# Patient Record
Sex: MALE | Race: WHITE | NOT HISPANIC OR LATINO | URBAN - METROPOLITAN AREA
[De-identification: names, ages, dates, MRNs, and addresses within clinical notes are randomized per-mention and may not be internally consistent; named-entity substitution may affect disease eponyms.]

---

## 2017-01-09 ENCOUNTER — OUTPATIENT (OUTPATIENT)
Dept: OUTPATIENT SERVICES | Facility: HOSPITAL | Age: 72
LOS: 1 days | End: 2017-01-09

## 2017-01-09 ENCOUNTER — APPOINTMENT (OUTPATIENT)
Dept: SPEECH THERAPY | Facility: HOSPITAL | Age: 72
End: 2017-01-09

## 2017-01-09 ENCOUNTER — APPOINTMENT (OUTPATIENT)
Dept: RADIOLOGY | Facility: HOSPITAL | Age: 72
End: 2017-01-09

## 2017-01-09 DIAGNOSIS — Z87.19 PERSONAL HISTORY OF OTHER DISEASES OF THE DIGESTIVE SYSTEM: Chronic | ICD-10-CM

## 2017-01-09 DIAGNOSIS — K44.9 DIAPHRAGMATIC HERNIA WITHOUT OBSTRUCTION OR GANGRENE: ICD-10-CM

## 2017-01-10 ENCOUNTER — APPOINTMENT (OUTPATIENT)
Dept: THORACIC SURGERY | Facility: CLINIC | Age: 72
End: 2017-01-10

## 2017-01-10 VITALS — HEIGHT: 71 IN | BODY MASS INDEX: 26.32 KG/M2 | WEIGHT: 188 LBS

## 2017-01-10 VITALS — OXYGEN SATURATION: 93 % | RESPIRATION RATE: 18 BRPM | HEART RATE: 60 BPM

## 2017-03-27 ENCOUNTER — OUTPATIENT (OUTPATIENT)
Dept: OUTPATIENT SERVICES | Facility: HOSPITAL | Age: 72
LOS: 1 days | End: 2017-03-27
Payer: COMMERCIAL

## 2017-03-27 ENCOUNTER — APPOINTMENT (OUTPATIENT)
Dept: CT IMAGING | Facility: CLINIC | Age: 72
End: 2017-03-27

## 2017-03-27 DIAGNOSIS — Z87.19 PERSONAL HISTORY OF OTHER DISEASES OF THE DIGESTIVE SYSTEM: Chronic | ICD-10-CM

## 2017-03-27 DIAGNOSIS — Z00.8 ENCOUNTER FOR OTHER GENERAL EXAMINATION: ICD-10-CM

## 2017-03-27 PROCEDURE — 71250 CT THORAX DX C-: CPT | Mod: 26

## 2017-03-27 PROCEDURE — 71250 CT THORAX DX C-: CPT

## 2017-04-04 ENCOUNTER — RESULT REVIEW (OUTPATIENT)
Age: 72
End: 2017-04-04

## 2017-04-17 ENCOUNTER — EMERGENCY (EMERGENCY)
Facility: HOSPITAL | Age: 72
LOS: 1 days | Discharge: HOME | End: 2017-04-17
Attending: EMERGENCY MEDICINE | Admitting: EMERGENCY MEDICINE
Payer: COMMERCIAL

## 2017-04-17 VITALS
OXYGEN SATURATION: 99 % | RESPIRATION RATE: 16 BRPM | DIASTOLIC BLOOD PRESSURE: 91 MMHG | SYSTOLIC BLOOD PRESSURE: 143 MMHG | TEMPERATURE: 98 F | HEART RATE: 77 BPM

## 2017-04-17 VITALS
RESPIRATION RATE: 15 BRPM | DIASTOLIC BLOOD PRESSURE: 89 MMHG | HEART RATE: 81 BPM | OXYGEN SATURATION: 96 % | WEIGHT: 182.98 LBS | SYSTOLIC BLOOD PRESSURE: 130 MMHG | TEMPERATURE: 99 F | HEIGHT: 70 IN

## 2017-04-17 DIAGNOSIS — Z87.19 PERSONAL HISTORY OF OTHER DISEASES OF THE DIGESTIVE SYSTEM: Chronic | ICD-10-CM

## 2017-04-17 DIAGNOSIS — E78.5 HYPERLIPIDEMIA, UNSPECIFIED: ICD-10-CM

## 2017-04-17 DIAGNOSIS — I82.402 ACUTE EMBOLISM AND THROMBOSIS OF UNSPECIFIED DEEP VEINS OF LEFT LOWER EXTREMITY: ICD-10-CM

## 2017-04-17 DIAGNOSIS — Z79.82 LONG TERM (CURRENT) USE OF ASPIRIN: ICD-10-CM

## 2017-04-17 DIAGNOSIS — Z98.890 OTHER SPECIFIED POSTPROCEDURAL STATES: ICD-10-CM

## 2017-04-17 DIAGNOSIS — N40.0 BENIGN PROSTATIC HYPERPLASIA WITHOUT LOWER URINARY TRACT SYMPTOMS: ICD-10-CM

## 2017-04-17 LAB
ALBUMIN SERPL ELPH-MCNC: 3.5 G/DL — SIGNIFICANT CHANGE UP (ref 3.3–5)
ALP SERPL-CCNC: 89 U/L — SIGNIFICANT CHANGE UP (ref 40–120)
ALT FLD-CCNC: 27 U/L — SIGNIFICANT CHANGE UP (ref 12–78)
ANION GAP SERPL CALC-SCNC: 8 MMOL/L — SIGNIFICANT CHANGE UP (ref 5–17)
APTT BLD: 29.4 SEC — SIGNIFICANT CHANGE UP (ref 27.5–37.4)
AST SERPL-CCNC: 19 U/L — SIGNIFICANT CHANGE UP (ref 15–37)
BASOPHILS # BLD AUTO: 0.1 K/UL — SIGNIFICANT CHANGE UP (ref 0–0.2)
BASOPHILS NFR BLD AUTO: 1 % — SIGNIFICANT CHANGE UP (ref 0–2)
BILIRUB SERPL-MCNC: 0.6 MG/DL — SIGNIFICANT CHANGE UP (ref 0.2–1.2)
BUN SERPL-MCNC: 13 MG/DL — SIGNIFICANT CHANGE UP (ref 7–23)
CALCIUM SERPL-MCNC: 8.2 MG/DL — LOW (ref 8.5–10.1)
CHLORIDE SERPL-SCNC: 106 MMOL/L — SIGNIFICANT CHANGE UP (ref 96–108)
CO2 SERPL-SCNC: 28 MMOL/L — SIGNIFICANT CHANGE UP (ref 22–31)
CREAT SERPL-MCNC: 0.85 MG/DL — SIGNIFICANT CHANGE UP (ref 0.5–1.3)
EOSINOPHIL # BLD AUTO: 0.2 K/UL — SIGNIFICANT CHANGE UP (ref 0–0.5)
EOSINOPHIL NFR BLD AUTO: 2.9 % — SIGNIFICANT CHANGE UP (ref 0–6)
GLUCOSE SERPL-MCNC: 95 MG/DL — SIGNIFICANT CHANGE UP (ref 70–99)
HCT VFR BLD CALC: 41.8 % — SIGNIFICANT CHANGE UP (ref 39–50)
HGB BLD-MCNC: 13.7 G/DL — SIGNIFICANT CHANGE UP (ref 13–17)
INR BLD: 1.04 RATIO — SIGNIFICANT CHANGE UP (ref 0.88–1.16)
LYMPHOCYTES # BLD AUTO: 1.6 K/UL — SIGNIFICANT CHANGE UP (ref 1–3.3)
LYMPHOCYTES # BLD AUTO: 27 % — SIGNIFICANT CHANGE UP (ref 13–44)
MCHC RBC-ENTMCNC: 31.3 PG — SIGNIFICANT CHANGE UP (ref 27–34)
MCHC RBC-ENTMCNC: 32.7 GM/DL — SIGNIFICANT CHANGE UP (ref 32–36)
MCV RBC AUTO: 95.8 FL — SIGNIFICANT CHANGE UP (ref 80–100)
MONOCYTES # BLD AUTO: 0.9 K/UL — SIGNIFICANT CHANGE UP (ref 0–0.9)
MONOCYTES NFR BLD AUTO: 14.7 % — HIGH (ref 1–9)
NEUTROPHILS # BLD AUTO: 3.2 K/UL — SIGNIFICANT CHANGE UP (ref 1.8–7.4)
NEUTROPHILS NFR BLD AUTO: 54.5 % — SIGNIFICANT CHANGE UP (ref 43–77)
PLATELET # BLD AUTO: 178 K/UL — SIGNIFICANT CHANGE UP (ref 150–400)
POTASSIUM SERPL-MCNC: 4.4 MMOL/L — SIGNIFICANT CHANGE UP (ref 3.5–5.3)
POTASSIUM SERPL-SCNC: 4.4 MMOL/L — SIGNIFICANT CHANGE UP (ref 3.5–5.3)
PROT SERPL-MCNC: 6.6 G/DL — SIGNIFICANT CHANGE UP (ref 6–8.3)
PROTHROM AB SERPL-ACNC: 11.3 SEC — SIGNIFICANT CHANGE UP (ref 9.8–12.7)
RBC # BLD: 4.36 M/UL — SIGNIFICANT CHANGE UP (ref 4.2–5.8)
RBC # FLD: 13 % — SIGNIFICANT CHANGE UP (ref 10.3–14.5)
SODIUM SERPL-SCNC: 142 MMOL/L — SIGNIFICANT CHANGE UP (ref 135–145)
WBC # BLD: 5.8 K/UL — SIGNIFICANT CHANGE UP (ref 3.8–10.5)
WBC # FLD AUTO: 5.8 K/UL — SIGNIFICANT CHANGE UP (ref 3.8–10.5)

## 2017-04-17 PROCEDURE — 96360 HYDRATION IV INFUSION INIT: CPT

## 2017-04-17 PROCEDURE — 85027 COMPLETE CBC AUTOMATED: CPT

## 2017-04-17 PROCEDURE — 99284 EMERGENCY DEPT VISIT MOD MDM: CPT | Mod: 25

## 2017-04-17 PROCEDURE — 85730 THROMBOPLASTIN TIME PARTIAL: CPT

## 2017-04-17 PROCEDURE — 71275 CT ANGIOGRAPHY CHEST: CPT

## 2017-04-17 PROCEDURE — 85610 PROTHROMBIN TIME: CPT

## 2017-04-17 PROCEDURE — 93005 ELECTROCARDIOGRAM TRACING: CPT

## 2017-04-17 PROCEDURE — 71275 CT ANGIOGRAPHY CHEST: CPT | Mod: 26

## 2017-04-17 PROCEDURE — 96372 THER/PROPH/DIAG INJ SC/IM: CPT | Mod: 59

## 2017-04-17 PROCEDURE — 99284 EMERGENCY DEPT VISIT MOD MDM: CPT

## 2017-04-17 PROCEDURE — 80053 COMPREHEN METABOLIC PANEL: CPT

## 2017-04-17 RX ORDER — SODIUM CHLORIDE 9 MG/ML
3 INJECTION INTRAMUSCULAR; INTRAVENOUS; SUBCUTANEOUS ONCE
Qty: 0 | Refills: 0 | Status: COMPLETED | OUTPATIENT
Start: 2017-04-17 | End: 2017-04-17

## 2017-04-17 RX ORDER — SODIUM CHLORIDE 9 MG/ML
1000 INJECTION INTRAMUSCULAR; INTRAVENOUS; SUBCUTANEOUS ONCE
Qty: 0 | Refills: 0 | Status: COMPLETED | OUTPATIENT
Start: 2017-04-17 | End: 2017-04-17

## 2017-04-17 RX ORDER — ENOXAPARIN SODIUM 100 MG/ML
80 INJECTION SUBCUTANEOUS ONCE
Qty: 0 | Refills: 0 | Status: COMPLETED | OUTPATIENT
Start: 2017-04-17 | End: 2017-04-17

## 2017-04-17 RX ORDER — FONDAPARINUX SODIUM 2.5 MG/.5ML
1 INJECTION, SOLUTION SUBCUTANEOUS
Qty: 42 | Refills: 0 | OUTPATIENT
Start: 2017-04-17 | End: 2017-05-08

## 2017-04-17 RX ADMIN — SODIUM CHLORIDE 1000 MILLILITER(S): 9 INJECTION INTRAMUSCULAR; INTRAVENOUS; SUBCUTANEOUS at 16:14

## 2017-04-17 RX ADMIN — SODIUM CHLORIDE 3 MILLILITER(S): 9 INJECTION INTRAMUSCULAR; INTRAVENOUS; SUBCUTANEOUS at 16:26

## 2017-04-17 RX ADMIN — ENOXAPARIN SODIUM 80 MILLIGRAM(S): 100 INJECTION SUBCUTANEOUS at 16:36

## 2017-04-17 NOTE — ED ADULT NURSE NOTE - OBJECTIVE STATEMENT
Pt reports Hx left leg DVT 5 years ago, was on blood thinners for a couple years but was stopped by PCP, now only takes 81 mg ASA daily. 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 Pt reports left leg has remained slightly enlarged since previous DVT. Pt denies SOB. Pt reports pain radiating from left nipple down chest, states he has seen his PCP about same complaint, cause is unknown

## 2017-04-17 NOTE — ED PROVIDER NOTE - CHPI ED SYMPTOMS NEG
no hematuria/no burning urination/no chills/no vomiting/no fever/no dysuria/no abdominal distension/no nausea/no blood in stool

## 2017-04-17 NOTE — ED PROVIDER NOTE - PROGRESS NOTE DETAILS
Dw Dr KATHARINE Dumont, ? PE on prev - check CTA, reeval, ? admit Lyle Dumont, ok with dc home, outpt fu.  Reevaluated patient at bedside.  Patient feeling well.  Discussed the results of all diagnostic testing in ED and copies of all reports given.   An opportunity to ask questions was given.  Discussed the importance of prompt, close medical follow-up.  Patient will return with any changes, concerns or persistent / worsening symptoms.  Understanding of all instructions verbalized.   All results were explained to patient and family and a copy of all available results given.  Pt will start xarelto tomorrow, discussed risks of blood thinner. He knows well, been on before. Dw Dr KATHARINE Dumont, ? PE on prev - check CTA, reeval Dw Dr KATHARINE Dumont, POs acute DVT on L, ? PE on prev CT - check CTA, reeval

## 2017-04-17 NOTE — ED ADULT NURSE NOTE - PMH
BPH (benign prostatic hyperplasia)    DVT (deep venous thrombosis)    Gastroesophageal reflux disease without esophagitis    Hiatal hernia    HLD (hyperlipidemia)

## 2017-04-17 NOTE — ED PROVIDER NOTE - OBJECTIVE STATEMENT
71 yo M p/w Chronic slight swelling to L leg x past ~ 4 - 5 years sp having DVT. Pt off anticoag  x past ~ 3 yrs.  Pt had CT chest (routine ) last week, found ? PE, then was sent for US today and found DVT - sent for eval. No sob. Pt with chronic mild discomfort L side of chest (months) with no recent change. No dyspnea. no abd pain,. No n/v/d. NO neck pain. No agg/allev factors. NO other inj or co. No recent trauma. Last surg 11/2016.

## 2017-06-26 ENCOUNTER — OUTPATIENT (OUTPATIENT)
Dept: OUTPATIENT SERVICES | Facility: HOSPITAL | Age: 72
LOS: 1 days | Discharge: HOME | End: 2017-06-26

## 2017-06-26 ENCOUNTER — OUTPATIENT (OUTPATIENT)
Dept: OUTPATIENT SERVICES | Facility: HOSPITAL | Age: 72
LOS: 1 days | End: 2017-06-26
Payer: MEDICARE

## 2017-06-26 ENCOUNTER — APPOINTMENT (OUTPATIENT)
Dept: RADIOLOGY | Facility: HOSPITAL | Age: 72
End: 2017-06-26

## 2017-06-26 DIAGNOSIS — K44.9 DIAPHRAGMATIC HERNIA WITHOUT OBSTRUCTION OR GANGRENE: ICD-10-CM

## 2017-06-26 DIAGNOSIS — Z87.19 PERSONAL HISTORY OF OTHER DISEASES OF THE DIGESTIVE SYSTEM: Chronic | ICD-10-CM

## 2017-06-26 PROCEDURE — 74230 X-RAY XM SWLNG FUNCJ C+: CPT | Mod: 26

## 2017-06-29 DIAGNOSIS — R13.19 OTHER DYSPHAGIA: ICD-10-CM

## 2017-07-11 ENCOUNTER — APPOINTMENT (OUTPATIENT)
Dept: THORACIC SURGERY | Facility: CLINIC | Age: 72
End: 2017-07-11

## 2017-07-11 VITALS
BODY MASS INDEX: 25.2 KG/M2 | OXYGEN SATURATION: 97 % | WEIGHT: 180 LBS | HEIGHT: 71 IN | RESPIRATION RATE: 16 BRPM | SYSTOLIC BLOOD PRESSURE: 114 MMHG | HEART RATE: 72 BPM | DIASTOLIC BLOOD PRESSURE: 60 MMHG

## 2018-01-01 ENCOUNTER — TRANSCRIPTION ENCOUNTER (OUTPATIENT)
Age: 73
End: 2018-01-01

## 2018-01-04 ENCOUNTER — APPOINTMENT (OUTPATIENT)
Dept: SPEECH THERAPY | Facility: HOSPITAL | Age: 73
End: 2018-01-04

## 2018-01-05 ENCOUNTER — APPOINTMENT (OUTPATIENT)
Dept: RADIOLOGY | Facility: HOSPITAL | Age: 73
End: 2018-01-05

## 2018-01-05 ENCOUNTER — OUTPATIENT (OUTPATIENT)
Dept: OUTPATIENT SERVICES | Facility: HOSPITAL | Age: 73
LOS: 1 days | Discharge: HOME | End: 2018-01-05

## 2018-01-05 ENCOUNTER — OUTPATIENT (OUTPATIENT)
Dept: OUTPATIENT SERVICES | Facility: HOSPITAL | Age: 73
LOS: 1 days | End: 2018-01-05
Payer: MEDICARE

## 2018-01-05 DIAGNOSIS — K44.9 DIAPHRAGMATIC HERNIA WITHOUT OBSTRUCTION OR GANGRENE: ICD-10-CM

## 2018-01-05 DIAGNOSIS — Z87.19 PERSONAL HISTORY OF OTHER DISEASES OF THE DIGESTIVE SYSTEM: Chronic | ICD-10-CM

## 2018-01-05 PROCEDURE — 74230 X-RAY XM SWLNG FUNCJ C+: CPT | Mod: 26

## 2018-01-09 ENCOUNTER — APPOINTMENT (OUTPATIENT)
Dept: THORACIC SURGERY | Facility: CLINIC | Age: 73
End: 2018-01-09
Payer: MEDICARE

## 2018-01-09 VITALS
DIASTOLIC BLOOD PRESSURE: 60 MMHG | HEIGHT: 71 IN | WEIGHT: 179 LBS | BODY MASS INDEX: 25.06 KG/M2 | HEART RATE: 72 BPM | SYSTOLIC BLOOD PRESSURE: 100 MMHG | OXYGEN SATURATION: 96 %

## 2018-01-09 DIAGNOSIS — Z86.718 PERSONAL HISTORY OF OTHER VENOUS THROMBOSIS AND EMBOLISM: ICD-10-CM

## 2018-01-09 DIAGNOSIS — K44.9 DIAPHRAGMATIC HERNIA W/OUT OBSTRUCTION OR GANGRENE: ICD-10-CM

## 2018-01-09 PROCEDURE — 99215 OFFICE O/P EST HI 40 MIN: CPT

## 2018-01-09 RX ORDER — WARFARIN 4 MG/1
TABLET ORAL
Refills: 0 | Status: ACTIVE | COMMUNITY

## 2018-01-09 RX ORDER — PNV NO.95/FERROUS FUM/FOLIC AC 28MG-0.8MG
TABLET ORAL
Refills: 0 | Status: ACTIVE | COMMUNITY

## 2018-02-16 DIAGNOSIS — R13.10 DYSPHAGIA, UNSPECIFIED: ICD-10-CM

## 2018-08-09 ENCOUNTER — OUTPATIENT (OUTPATIENT)
Dept: OUTPATIENT SERVICES | Facility: HOSPITAL | Age: 73
LOS: 1 days | End: 2018-08-09
Payer: COMMERCIAL

## 2018-08-09 VITALS
SYSTOLIC BLOOD PRESSURE: 104 MMHG | HEIGHT: 70 IN | DIASTOLIC BLOOD PRESSURE: 71 MMHG | WEIGHT: 175.05 LBS | HEART RATE: 74 BPM | TEMPERATURE: 98 F | RESPIRATION RATE: 15 BRPM

## 2018-08-09 DIAGNOSIS — Z41.9 ENCOUNTER FOR PROCEDURE FOR PURPOSES OTHER THAN REMEDYING HEALTH STATE, UNSPECIFIED: Chronic | ICD-10-CM

## 2018-08-09 DIAGNOSIS — S43.421A SPRAIN OF RIGHT ROTATOR CUFF CAPSULE, INITIAL ENCOUNTER: ICD-10-CM

## 2018-08-09 DIAGNOSIS — Z01.818 ENCOUNTER FOR OTHER PREPROCEDURAL EXAMINATION: ICD-10-CM

## 2018-08-09 DIAGNOSIS — Z87.19 PERSONAL HISTORY OF OTHER DISEASES OF THE DIGESTIVE SYSTEM: Chronic | ICD-10-CM

## 2018-08-09 LAB
ALBUMIN SERPL ELPH-MCNC: 3.6 G/DL — SIGNIFICANT CHANGE UP (ref 3.3–5)
ALP SERPL-CCNC: 75 U/L — SIGNIFICANT CHANGE UP (ref 40–120)
ALT FLD-CCNC: 15 U/L — SIGNIFICANT CHANGE UP (ref 12–78)
ANION GAP SERPL CALC-SCNC: 5 MMOL/L — SIGNIFICANT CHANGE UP (ref 5–17)
APTT BLD: 33.9 SEC — SIGNIFICANT CHANGE UP (ref 27.5–37.4)
AST SERPL-CCNC: 12 U/L — LOW (ref 15–37)
BILIRUB SERPL-MCNC: 0.8 MG/DL — SIGNIFICANT CHANGE UP (ref 0.2–1.2)
BUN SERPL-MCNC: 13 MG/DL — SIGNIFICANT CHANGE UP (ref 7–23)
CALCIUM SERPL-MCNC: 8.9 MG/DL — SIGNIFICANT CHANGE UP (ref 8.5–10.1)
CHLORIDE SERPL-SCNC: 107 MMOL/L — SIGNIFICANT CHANGE UP (ref 96–108)
CO2 SERPL-SCNC: 32 MMOL/L — HIGH (ref 22–31)
CREAT SERPL-MCNC: 0.85 MG/DL — SIGNIFICANT CHANGE UP (ref 0.5–1.3)
GLUCOSE SERPL-MCNC: 86 MG/DL — SIGNIFICANT CHANGE UP (ref 70–99)
HCT VFR BLD CALC: 42.5 % — SIGNIFICANT CHANGE UP (ref 39–50)
HGB BLD-MCNC: 14.1 G/DL — SIGNIFICANT CHANGE UP (ref 13–17)
INR BLD: 1.61 RATIO — HIGH (ref 0.88–1.16)
MCHC RBC-ENTMCNC: 31.7 PG — SIGNIFICANT CHANGE UP (ref 27–34)
MCHC RBC-ENTMCNC: 33.2 GM/DL — SIGNIFICANT CHANGE UP (ref 32–36)
MCV RBC AUTO: 95.5 FL — SIGNIFICANT CHANGE UP (ref 80–100)
NRBC # BLD: 0 /100 WBCS — SIGNIFICANT CHANGE UP (ref 0–0)
PLATELET # BLD AUTO: 165 K/UL — SIGNIFICANT CHANGE UP (ref 150–400)
POTASSIUM SERPL-MCNC: 4.5 MMOL/L — SIGNIFICANT CHANGE UP (ref 3.5–5.3)
POTASSIUM SERPL-SCNC: 4.5 MMOL/L — SIGNIFICANT CHANGE UP (ref 3.5–5.3)
PROT SERPL-MCNC: 6.7 G/DL — SIGNIFICANT CHANGE UP (ref 6–8.3)
PROTHROM AB SERPL-ACNC: 17.7 SEC — HIGH (ref 9.8–12.7)
RBC # BLD: 4.45 M/UL — SIGNIFICANT CHANGE UP (ref 4.2–5.8)
RBC # FLD: 13.1 % — SIGNIFICANT CHANGE UP (ref 10.3–14.5)
SODIUM SERPL-SCNC: 144 MMOL/L — SIGNIFICANT CHANGE UP (ref 135–145)
WBC # BLD: 4.12 K/UL — SIGNIFICANT CHANGE UP (ref 3.8–10.5)
WBC # FLD AUTO: 4.12 K/UL — SIGNIFICANT CHANGE UP (ref 3.8–10.5)

## 2018-08-09 PROCEDURE — 93005 ELECTROCARDIOGRAM TRACING: CPT

## 2018-08-09 PROCEDURE — 85610 PROTHROMBIN TIME: CPT

## 2018-08-09 PROCEDURE — 80053 COMPREHEN METABOLIC PANEL: CPT

## 2018-08-09 PROCEDURE — 85027 COMPLETE CBC AUTOMATED: CPT

## 2018-08-09 PROCEDURE — G0463: CPT

## 2018-08-09 PROCEDURE — 93010 ELECTROCARDIOGRAM REPORT: CPT | Mod: NC

## 2018-08-09 PROCEDURE — 85730 THROMBOPLASTIN TIME PARTIAL: CPT

## 2018-08-09 NOTE — H&P PST ADULT - FAMILY HISTORY
Father  Still living? No  Family history of early CAD, Age at diagnosis: Age Unknown     Mother  Still living? Unknown  Family history of Alzheimer's disease, Age at diagnosis: Age Unknown

## 2018-08-09 NOTE — H&P PST ADULT - PMH
BPH (benign prostatic hyperplasia)    DVT (deep venous thrombosis)  left popliteal  > 5 yrs  Gastroesophageal reflux disease without esophagitis    Hiatal hernia    HLD (hyperlipidemia)

## 2018-08-10 PROBLEM — I82.409 ACUTE EMBOLISM AND THROMBOSIS OF UNSPECIFIED DEEP VEINS OF UNSPECIFIED LOWER EXTREMITY: Chronic | Status: ACTIVE | Noted: 2017-04-17

## 2018-08-13 ENCOUNTER — TRANSCRIPTION ENCOUNTER (OUTPATIENT)
Age: 73
End: 2018-08-13

## 2018-08-14 ENCOUNTER — RESULT REVIEW (OUTPATIENT)
Age: 73
End: 2018-08-14

## 2018-08-14 ENCOUNTER — OUTPATIENT (OUTPATIENT)
Dept: OUTPATIENT SERVICES | Facility: HOSPITAL | Age: 73
LOS: 1 days | End: 2018-08-14
Payer: COMMERCIAL

## 2018-08-14 VITALS
SYSTOLIC BLOOD PRESSURE: 126 MMHG | HEART RATE: 70 BPM | RESPIRATION RATE: 18 BRPM | DIASTOLIC BLOOD PRESSURE: 81 MMHG | WEIGHT: 177.91 LBS | TEMPERATURE: 98 F | HEIGHT: 70 IN | OXYGEN SATURATION: 96 %

## 2018-08-14 VITALS
DIASTOLIC BLOOD PRESSURE: 60 MMHG | OXYGEN SATURATION: 96 % | SYSTOLIC BLOOD PRESSURE: 110 MMHG | RESPIRATION RATE: 18 BRPM | HEART RATE: 78 BPM

## 2018-08-14 DIAGNOSIS — S43.421A SPRAIN OF RIGHT ROTATOR CUFF CAPSULE, INITIAL ENCOUNTER: ICD-10-CM

## 2018-08-14 DIAGNOSIS — Z41.9 ENCOUNTER FOR PROCEDURE FOR PURPOSES OTHER THAN REMEDYING HEALTH STATE, UNSPECIFIED: Chronic | ICD-10-CM

## 2018-08-14 DIAGNOSIS — Z87.19 PERSONAL HISTORY OF OTHER DISEASES OF THE DIGESTIVE SYSTEM: Chronic | ICD-10-CM

## 2018-08-14 LAB
APTT BLD: 30.7 SEC — SIGNIFICANT CHANGE UP (ref 27.5–37.4)
INR BLD: 1.13 RATIO — SIGNIFICANT CHANGE UP (ref 0.88–1.16)
PROTHROM AB SERPL-ACNC: 12.3 SEC — SIGNIFICANT CHANGE UP (ref 9.8–12.7)

## 2018-08-14 PROCEDURE — C1889: CPT

## 2018-08-14 PROCEDURE — 29823 SHO ARTHRS SRG XTNSV DBRDMT: CPT | Mod: RT

## 2018-08-14 PROCEDURE — 85730 THROMBOPLASTIN TIME PARTIAL: CPT

## 2018-08-14 PROCEDURE — 29999 UNLISTED PX ARTHROSCOPY: CPT | Mod: RT

## 2018-08-14 PROCEDURE — C1713: CPT

## 2018-08-14 PROCEDURE — 29827 SHO ARTHRS SRG RT8TR CUF RPR: CPT | Mod: RT

## 2018-08-14 PROCEDURE — 85610 PROTHROMBIN TIME: CPT

## 2018-08-14 PROCEDURE — 88304 TISSUE EXAM BY PATHOLOGIST: CPT

## 2018-08-14 PROCEDURE — 29826 SHO ARTHRS SRG DECOMPRESSION: CPT | Mod: RT

## 2018-08-14 PROCEDURE — 88304 TISSUE EXAM BY PATHOLOGIST: CPT | Mod: 26

## 2018-08-14 RX ORDER — SODIUM CHLORIDE 9 MG/ML
1000 INJECTION, SOLUTION INTRAVENOUS
Qty: 0 | Refills: 0 | Status: DISCONTINUED | OUTPATIENT
Start: 2018-08-14 | End: 2018-08-14

## 2018-08-14 RX ORDER — ONDANSETRON 8 MG/1
4 TABLET, FILM COATED ORAL ONCE
Qty: 0 | Refills: 0 | Status: DISCONTINUED | OUTPATIENT
Start: 2018-08-14 | End: 2018-08-14

## 2018-08-14 RX ORDER — HYDROMORPHONE HYDROCHLORIDE 2 MG/ML
0.5 INJECTION INTRAMUSCULAR; INTRAVENOUS; SUBCUTANEOUS ONCE
Qty: 0 | Refills: 0 | Status: DISCONTINUED | OUTPATIENT
Start: 2018-08-14 | End: 2018-08-14

## 2018-08-14 RX ORDER — ACETAMINOPHEN 500 MG
1000 TABLET ORAL ONCE
Qty: 0 | Refills: 0 | Status: COMPLETED | OUTPATIENT
Start: 2018-08-14 | End: 2018-08-14

## 2018-08-14 RX ORDER — CEFAZOLIN SODIUM 1 G
2000 VIAL (EA) INJECTION ONCE
Qty: 0 | Refills: 0 | Status: COMPLETED | OUTPATIENT
Start: 2018-08-14 | End: 2018-08-14

## 2018-08-14 RX ADMIN — Medication 1000 MILLIGRAM(S): at 14:56

## 2018-08-14 RX ADMIN — SODIUM CHLORIDE 75 MILLILITER(S): 9 INJECTION, SOLUTION INTRAVENOUS at 10:00

## 2018-08-14 RX ADMIN — SODIUM CHLORIDE 75 MILLILITER(S): 9 INJECTION, SOLUTION INTRAVENOUS at 14:18

## 2018-08-14 RX ADMIN — Medication 400 MILLIGRAM(S): at 14:25

## 2018-08-14 NOTE — ASU DISCHARGE PLAN (ADULT/PEDIATRIC). - MEDICATION SUMMARY - MEDICATIONS TO TAKE
I will START or STAY ON the medications listed below when I get home from the hospital:    oxyCODONE-acetaminophen 5 mg-325 mg oral tablet  -- 1 tab(s) by mouth every 4 hours x 7 days, As Needed -for severe pain MDD:6   -- Caution federal law prohibits the transfer of this drug to any person other  than the person for whom it was prescribed.  May cause drowsiness.  Alcohol may intensify this effect.  Use care when operating dangerous machinery.  This prescription cannot be refilled.  This product contains acetaminophen.  Do not use  with any other product containing acetaminophen to prevent possible liver damage.  Using more of this medication than prescribed may cause serious breathing problems.    -- Indication: For for severe pain    Coumadin 7.5 mg oral tablet  -- 1 tab(s) by mouth once a day  -- Indication: For per pmd    atorvastatin 10 mg oral tablet  -- 1 tab(s) by mouth once a day  -- Indication: For per pmd

## 2018-08-14 NOTE — ASU DISCHARGE PLAN (ADULT/PEDIATRIC). - NOTIFY
Persistent Nausea and Vomiting/Numbness, color, or temperature change to extremity/Bleeding that does not stop/Pain not relieved by Medications/Swelling that continues

## 2018-08-14 NOTE — ASU DISCHARGE PLAN (ADULT/PEDIATRIC). - SPECIAL INSTRUCTIONS
Shoulder Arthroscopy Instructions    1) Your shoulder may swell over the next 48hours and you can expect pain to get a bit worse. Ice it plenty, continuously if possible. Fill up a plastic bag, then wrap it in a towel or pillow case and lay it on your shoulder.     2) Elevate your hand when you can, and wiggle your fingers as often as you think of it. Otherwise you should be up and about, walking as much as you can tolerate. The more you move the better you will do. No weight bearing on the arm yet.    3) Expect some drainage onto the bandage. It is normal. It may even soak through and look bloody. This is mostly leftover Saline fluid coming out from surgery. Even a drop of blood can make it look very bloody. Just place another Gauze over it. If it bleeds through the second bandage again, call.    4) Remove bandage in 72hrs and place waterproof band aides. You can shower in 72 hours. No bath. Pat your incisions dry. No creams, no lotions.    5) Only reason to worry would be if pain got so severe that you cannot feel or wiggle your fingers. In this case you need to call or come to the ER. But as long as you can feel or wiggle your fingers, you are fine.    6) Call the office to schedule a follow up appointment to be seen in 10-14 days. Your Sutures will be removed at that point.    7) A pain Rx is in the chart; fill it on your way home. OR was sent electronically to your pharmacy, pick it up on the way home.

## 2018-08-14 NOTE — BRIEF OPERATIVE NOTE - PROCEDURE
<<-----Click on this checkbox to enter Procedure Reconstruction, ligament, shoulder  08/14/2018    Active  NFRANE

## 2018-08-16 LAB — SURGICAL PATHOLOGY FINAL REPORT - CH: SIGNIFICANT CHANGE UP

## 2018-09-05 ENCOUNTER — RESULT REVIEW (OUTPATIENT)
Age: 73
End: 2018-09-05

## 2018-09-17 ENCOUNTER — APPOINTMENT (OUTPATIENT)
Dept: UROLOGY | Facility: CLINIC | Age: 73
End: 2018-09-17
Payer: MEDICARE

## 2018-09-17 VITALS
SYSTOLIC BLOOD PRESSURE: 109 MMHG | OXYGEN SATURATION: 98 % | TEMPERATURE: 97.8 F | HEART RATE: 80 BPM | HEIGHT: 70 IN | BODY MASS INDEX: 24.62 KG/M2 | WEIGHT: 172 LBS | DIASTOLIC BLOOD PRESSURE: 73 MMHG

## 2018-09-17 DIAGNOSIS — N13.8 BENIGN PROSTATIC HYPERPLASIA WITH LOWER URINARY TRACT SYMPMS: ICD-10-CM

## 2018-09-17 DIAGNOSIS — R97.20 ELEVATED PROSTATE, SPECIFIC ANTIGEN [PSA]: ICD-10-CM

## 2018-09-17 DIAGNOSIS — R31.0 GROSS HEMATURIA: ICD-10-CM

## 2018-09-17 DIAGNOSIS — N40.1 BENIGN PROSTATIC HYPERPLASIA WITH LOWER URINARY TRACT SYMPMS: ICD-10-CM

## 2018-09-17 PROCEDURE — 99214 OFFICE O/P EST MOD 30 MIN: CPT | Mod: 25

## 2018-09-17 PROCEDURE — 51798 US URINE CAPACITY MEASURE: CPT

## 2018-09-18 LAB
APPEARANCE: ABNORMAL
BACTERIA: NEGATIVE
BILIRUBIN URINE: NEGATIVE
BLOOD URINE: NEGATIVE
COLOR: ABNORMAL
GLUCOSE QUALITATIVE U: NEGATIVE MG/DL
HYALINE CASTS: 1 /LPF
KETONES URINE: NEGATIVE
LEUKOCYTE ESTERASE URINE: NEGATIVE
MICROSCOPIC-UA: NORMAL
NITRITE URINE: NEGATIVE
PH URINE: 5.5
PROTEIN URINE: NEGATIVE MG/DL
RED BLOOD CELLS URINE: 20 /HPF
SPECIFIC GRAVITY URINE: 1.03
SQUAMOUS EPITHELIAL CELLS: 0 /HPF
UROBILINOGEN URINE: NEGATIVE MG/DL
WHITE BLOOD CELLS URINE: 3 /HPF

## 2018-09-19 LAB
BACTERIA UR CULT: NORMAL
CORE LAB FLUID CYTOLOGY: NORMAL

## 2018-09-21 ENCOUNTER — APPOINTMENT (OUTPATIENT)
Dept: CT IMAGING | Facility: CLINIC | Age: 73
End: 2018-09-21

## 2018-09-21 ENCOUNTER — OUTPATIENT (OUTPATIENT)
Dept: OUTPATIENT SERVICES | Facility: HOSPITAL | Age: 73
LOS: 1 days | End: 2018-09-21
Payer: COMMERCIAL

## 2018-09-21 DIAGNOSIS — Z00.8 ENCOUNTER FOR OTHER GENERAL EXAMINATION: ICD-10-CM

## 2018-09-21 DIAGNOSIS — Z87.19 PERSONAL HISTORY OF OTHER DISEASES OF THE DIGESTIVE SYSTEM: Chronic | ICD-10-CM

## 2018-09-21 DIAGNOSIS — Z41.9 ENCOUNTER FOR PROCEDURE FOR PURPOSES OTHER THAN REMEDYING HEALTH STATE, UNSPECIFIED: Chronic | ICD-10-CM

## 2018-09-21 PROCEDURE — 74178 CT ABD&PLV WO CNTR FLWD CNTR: CPT | Mod: 26

## 2018-09-21 PROCEDURE — 74178 CT ABD&PLV WO CNTR FLWD CNTR: CPT

## 2018-12-21 ENCOUNTER — INPATIENT (INPATIENT)
Facility: HOSPITAL | Age: 73
LOS: 2 days | Discharge: HOME | DRG: 66 | End: 2018-12-24
Attending: FAMILY MEDICINE | Admitting: STUDENT IN AN ORGANIZED HEALTH CARE EDUCATION/TRAINING PROGRAM
Payer: COMMERCIAL

## 2018-12-21 VITALS
TEMPERATURE: 101 F | HEIGHT: 71 IN | DIASTOLIC BLOOD PRESSURE: 79 MMHG | OXYGEN SATURATION: 96 % | SYSTOLIC BLOOD PRESSURE: 127 MMHG | WEIGHT: 175.05 LBS | HEART RATE: 82 BPM | RESPIRATION RATE: 20 BRPM

## 2018-12-21 DIAGNOSIS — Z87.19 PERSONAL HISTORY OF OTHER DISEASES OF THE DIGESTIVE SYSTEM: Chronic | ICD-10-CM

## 2018-12-21 DIAGNOSIS — I63.9 CEREBRAL INFARCTION, UNSPECIFIED: ICD-10-CM

## 2018-12-21 DIAGNOSIS — Z41.9 ENCOUNTER FOR PROCEDURE FOR PURPOSES OTHER THAN REMEDYING HEALTH STATE, UNSPECIFIED: Chronic | ICD-10-CM

## 2018-12-21 DIAGNOSIS — Z29.9 ENCOUNTER FOR PROPHYLACTIC MEASURES, UNSPECIFIED: ICD-10-CM

## 2018-12-21 DIAGNOSIS — E78.5 HYPERLIPIDEMIA, UNSPECIFIED: ICD-10-CM

## 2018-12-21 DIAGNOSIS — Z98.890 OTHER SPECIFIED POSTPROCEDURAL STATES: Chronic | ICD-10-CM

## 2018-12-21 DIAGNOSIS — K21.9 GASTRO-ESOPHAGEAL REFLUX DISEASE WITHOUT ESOPHAGITIS: ICD-10-CM

## 2018-12-21 DIAGNOSIS — N40.0 BENIGN PROSTATIC HYPERPLASIA WITHOUT LOWER URINARY TRACT SYMPTOMS: ICD-10-CM

## 2018-12-21 DIAGNOSIS — I82.409 ACUTE EMBOLISM AND THROMBOSIS OF UNSPECIFIED DEEP VEINS OF UNSPECIFIED LOWER EXTREMITY: ICD-10-CM

## 2018-12-21 LAB
ALBUMIN SERPL ELPH-MCNC: 3.5 G/DL — SIGNIFICANT CHANGE UP (ref 3.3–5)
ALP SERPL-CCNC: 72 U/L — SIGNIFICANT CHANGE UP (ref 40–120)
ALT FLD-CCNC: 21 U/L — SIGNIFICANT CHANGE UP (ref 12–78)
ANION GAP SERPL CALC-SCNC: 6 MMOL/L — SIGNIFICANT CHANGE UP (ref 5–17)
APPEARANCE UR: CLEAR — SIGNIFICANT CHANGE UP
APTT BLD: 34.7 SEC — SIGNIFICANT CHANGE UP (ref 27.5–36.3)
AST SERPL-CCNC: 15 U/L — SIGNIFICANT CHANGE UP (ref 15–37)
BASOPHILS # BLD AUTO: 0.02 K/UL — SIGNIFICANT CHANGE UP (ref 0–0.2)
BASOPHILS NFR BLD AUTO: 0.4 % — SIGNIFICANT CHANGE UP (ref 0–2)
BILIRUB SERPL-MCNC: 0.8 MG/DL — SIGNIFICANT CHANGE UP (ref 0.2–1.2)
BILIRUB UR-MCNC: NEGATIVE — SIGNIFICANT CHANGE UP
BUN SERPL-MCNC: 15 MG/DL — SIGNIFICANT CHANGE UP (ref 7–23)
CALCIUM SERPL-MCNC: 7.9 MG/DL — LOW (ref 8.5–10.1)
CHLORIDE SERPL-SCNC: 110 MMOL/L — HIGH (ref 96–108)
CO2 SERPL-SCNC: 26 MMOL/L — SIGNIFICANT CHANGE UP (ref 22–31)
COLOR SPEC: YELLOW — SIGNIFICANT CHANGE UP
CREAT SERPL-MCNC: 1.1 MG/DL — SIGNIFICANT CHANGE UP (ref 0.5–1.3)
DIFF PNL FLD: NEGATIVE — SIGNIFICANT CHANGE UP
EOSINOPHIL # BLD AUTO: 0.27 K/UL — SIGNIFICANT CHANGE UP (ref 0–0.5)
EOSINOPHIL NFR BLD AUTO: 5.2 % — SIGNIFICANT CHANGE UP (ref 0–6)
FLU A RESULT: SIGNIFICANT CHANGE UP
FLU A RESULT: SIGNIFICANT CHANGE UP
FLUAV AG NPH QL: SIGNIFICANT CHANGE UP
FLUBV AG NPH QL: SIGNIFICANT CHANGE UP
GLUCOSE SERPL-MCNC: 121 MG/DL — HIGH (ref 70–99)
GLUCOSE UR QL: NEGATIVE — SIGNIFICANT CHANGE UP
HCT VFR BLD CALC: 40.7 % — SIGNIFICANT CHANGE UP (ref 39–50)
HGB BLD-MCNC: 13.8 G/DL — SIGNIFICANT CHANGE UP (ref 13–17)
IMM GRANULOCYTES NFR BLD AUTO: 0.4 % — SIGNIFICANT CHANGE UP (ref 0–1.5)
INR BLD: 1.7 RATIO — HIGH (ref 0.88–1.16)
KETONES UR-MCNC: NEGATIVE — SIGNIFICANT CHANGE UP
LACTATE SERPL-SCNC: 1 MMOL/L — SIGNIFICANT CHANGE UP (ref 0.7–2)
LEUKOCYTE ESTERASE UR-ACNC: NEGATIVE — SIGNIFICANT CHANGE UP
LYMPHOCYTES # BLD AUTO: 1.35 K/UL — SIGNIFICANT CHANGE UP (ref 1–3.3)
LYMPHOCYTES # BLD AUTO: 26.1 % — SIGNIFICANT CHANGE UP (ref 13–44)
MCHC RBC-ENTMCNC: 31.9 PG — SIGNIFICANT CHANGE UP (ref 27–34)
MCHC RBC-ENTMCNC: 33.9 GM/DL — SIGNIFICANT CHANGE UP (ref 32–36)
MCV RBC AUTO: 94 FL — SIGNIFICANT CHANGE UP (ref 80–100)
MONOCYTES # BLD AUTO: 0.59 K/UL — SIGNIFICANT CHANGE UP (ref 0–0.9)
MONOCYTES NFR BLD AUTO: 11.4 % — SIGNIFICANT CHANGE UP (ref 2–14)
NEUTROPHILS # BLD AUTO: 2.93 K/UL — SIGNIFICANT CHANGE UP (ref 1.8–7.4)
NEUTROPHILS NFR BLD AUTO: 56.5 % — SIGNIFICANT CHANGE UP (ref 43–77)
NITRITE UR-MCNC: NEGATIVE — SIGNIFICANT CHANGE UP
PH UR: 7 — SIGNIFICANT CHANGE UP (ref 5–8)
PLATELET # BLD AUTO: 151 K/UL — SIGNIFICANT CHANGE UP (ref 150–400)
POTASSIUM SERPL-MCNC: 4.5 MMOL/L — SIGNIFICANT CHANGE UP (ref 3.5–5.3)
POTASSIUM SERPL-SCNC: 4.5 MMOL/L — SIGNIFICANT CHANGE UP (ref 3.5–5.3)
PROT SERPL-MCNC: 6.4 G/DL — SIGNIFICANT CHANGE UP (ref 6–8.3)
PROT UR-MCNC: NEGATIVE — SIGNIFICANT CHANGE UP
PROTHROM AB SERPL-ACNC: 19.5 SEC — HIGH (ref 10–12.9)
RBC # BLD: 4.33 M/UL — SIGNIFICANT CHANGE UP (ref 4.2–5.8)
RBC # FLD: 13.2 % — SIGNIFICANT CHANGE UP (ref 10.3–14.5)
RSV RESULT: SIGNIFICANT CHANGE UP
RSV RNA RESP QL NAA+PROBE: SIGNIFICANT CHANGE UP
SODIUM SERPL-SCNC: 142 MMOL/L — SIGNIFICANT CHANGE UP (ref 135–145)
SP GR SPEC: 1.01 — SIGNIFICANT CHANGE UP (ref 1.01–1.02)
UROBILINOGEN FLD QL: 1
WBC # BLD: 5.18 K/UL — SIGNIFICANT CHANGE UP (ref 3.8–10.5)
WBC # FLD AUTO: 5.18 K/UL — SIGNIFICANT CHANGE UP (ref 3.8–10.5)

## 2018-12-21 PROCEDURE — 93010 ELECTROCARDIOGRAM REPORT: CPT

## 2018-12-21 PROCEDURE — 71045 X-RAY EXAM CHEST 1 VIEW: CPT | Mod: 26

## 2018-12-21 PROCEDURE — 70450 CT HEAD/BRAIN W/O DYE: CPT | Mod: 26

## 2018-12-21 PROCEDURE — 99285 EMERGENCY DEPT VISIT HI MDM: CPT

## 2018-12-21 PROCEDURE — 99223 1ST HOSP IP/OBS HIGH 75: CPT | Mod: GC,AI

## 2018-12-21 PROCEDURE — 70544 MR ANGIOGRAPHY HEAD W/O DYE: CPT | Mod: 26,59

## 2018-12-21 PROCEDURE — 70551 MRI BRAIN STEM W/O DYE: CPT | Mod: 26

## 2018-12-21 RX ORDER — WARFARIN SODIUM 2.5 MG/1
7.5 TABLET ORAL ONCE
Qty: 0 | Refills: 0 | Status: COMPLETED | OUTPATIENT
Start: 2018-12-21 | End: 2018-12-21

## 2018-12-21 RX ORDER — ACETAMINOPHEN 500 MG
650 TABLET ORAL ONCE
Qty: 0 | Refills: 0 | Status: COMPLETED | OUTPATIENT
Start: 2018-12-21 | End: 2018-12-21

## 2018-12-21 RX ORDER — ATORVASTATIN CALCIUM 80 MG/1
10 TABLET, FILM COATED ORAL AT BEDTIME
Qty: 0 | Refills: 0 | Status: DISCONTINUED | OUTPATIENT
Start: 2018-12-21 | End: 2018-12-24

## 2018-12-21 RX ADMIN — ATORVASTATIN CALCIUM 10 MILLIGRAM(S): 80 TABLET, FILM COATED ORAL at 22:12

## 2018-12-21 RX ADMIN — Medication 650 MILLIGRAM(S): at 11:54

## 2018-12-21 RX ADMIN — Medication 650 MILLIGRAM(S): at 15:15

## 2018-12-21 RX ADMIN — WARFARIN SODIUM 7.5 MILLIGRAM(S): 2.5 TABLET ORAL at 22:12

## 2018-12-21 NOTE — H&P ADULT - HISTORY OF PRESENT ILLNESS
72 yo M with PMH of HLD, GERD, DVT, BPH and hiatal hernia presents with slurred speech.    In the ED, vitals were T 100.7, HR 82, /79, RR 20, SpO2 96% on RA. Labs showed INR 1.7, PT 19.5, Cl 110, Ca 7.9. Negative U/A and flu testing. CT head negative for hemorrhage/infarct. CT abd/pelvis showed No renal stones, renal masses or evidence of a urothelial lesion. Enlarged prostate. MR head showed Subcentimeter acute/subacute infarct left corona radiata. No acute intracranial hemorrhage. Loss of normal flow void at the distal petrous segment of left internal carotid artery. MRA head was unremarkable. 72 yo M with PMH of HLD, GERD, DVT, BPH and hiatal hernia (s/p repair) presents with slurred speech. He reports that he was out at breakfast this morning, sitting and eating when he noticed that he had slurred speech and generalized weakness, more in his legs (~8:30 AM). Patient then drove home and came to the ED with his wife. This has never occurred before. Wife at bedside and patient report that the slurred speech is slightly worse now. Denies any FNDs and is not currently feeling weak. Patient worked on a crossword puzzle while waiting in the ED and denies any confusion.    In the ED, vitals were T 100.7, HR 82, /79, RR 20, SpO2 96% on RA. Labs showed INR 1.7, PT 19.5, Cl 110, Ca 7.9. Negative U/A and flu testing. CT head negative for hemorrhage/infarct. CT abd/pelvis showed No renal stones, renal masses or evidence of a urothelial lesion. Enlarged prostate. MR head showed Subcentimeter acute/subacute infarct left corona radiata. No acute intracranial hemorrhage. Loss of normal flow void at the distal petrous segment of left internal carotid artery. MRA head was unremarkable. 74 yo M with PMH of HLD, GERD, DVT, BPH and hiatal hernia (s/p repair) presents with slurred speech. He reports that he was out at breakfast this morning, sitting and eating when he noticed that he had slurred speech and generalized weakness, more in his legs (~8:30 AM). Patient then drove home and came to the ED with his wife. This has never occurred before. Wife at bedside and patient report that the slurred speech is slightly worse now. Denies any FNDs and is not currently feeling weak. Patient worked on a crossword puzzle while waiting in the ED and denies any confusion. Denies any history of CVA, MI or A fib. He reports having a DVT in his left leg ~5 years ago for which he has been on Coumadin and has his blood checked once in awhile for the Coumadin. Denies fever, chills, chest pain, palpitations, SOB, cough, abdominal pain, nausea, vomiting, diarrhea, constipation, urinary frequency, urgency, or dysuria, headaches, changes in vision, dizziness, numbness, tingling or other complaints.    In the ED, vitals were T 100.7, HR 82, /79, RR 20, SpO2 96% on RA. Labs showed INR 1.7, PT 19.5, Cl 110, Ca 7.9. Negative U/A and flu testing. CT head negative for hemorrhage/infarct. CT abd/pelvis showed No renal stones, renal masses or evidence of a urothelial lesion. Enlarged prostate. MR head showed Subcentimeter acute/subacute infarct left corona radiata. No acute intracranial hemorrhage. Loss of normal flow void at the distal petrous segment of left internal carotid artery. MRA head was unremarkable. 72 yo M with PMH of HLD, GERD, DVT, BPH and hiatal hernia (s/p repair) presents with slurred speech. He reports that he was out at breakfast this morning, sitting and eating when he noticed that he had slurred speech and generalized weakness, more in his legs (~8:30 AM). Patient then drove home and came to the ED with his wife. This has never occurred before. Wife at bedside and patient report that the slurred speech is slightly worse now. Denies any FNDs and is not currently feeling weak. Patient worked on a crossword puzzle while waiting in the ED and denies any confusion. Denies any history of CVA, MI or A fib. He reports having a DVT in his left leg ~5 years ago for which he has been on Coumadin and has his blood checked once in awhile for the Coumadin. Denies fever, chills, chest pain, palpitations, SOB, cough, abdominal pain, nausea, vomiting, diarrhea, constipation, urinary frequency, urgency, or dysuria, headaches, changes in vision, dizziness, numbness, tingling or other complaints.    In the ED, vitals were T 100.7, HR 82, /79, RR 20, SpO2 96% on RA. Labs showed INR 1.7, PT 19.5, Cl 110, Ca 7.9. Negative U/A and flu testing. CT head negative for hemorrhage/infarct. CT abd/pelvis showed No renal stones, renal masses or evidence of a urothelial lesion. Enlarged prostate. MR head showed Subcentimeter acute/subacute infarct left corona radiata. No acute intracranial hemorrhage. Loss of normal flow void at the distal petrous segment of left internal carotid artery. MRA head was unremarkable. CXR negative for pulm process. 74 yo M with PMH of HLD, GERD, multiple DVTs, BPH and hiatal hernia (s/p repair) presents with slurred speech. He reports that he was out at breakfast this morning, sitting and eating when he noticed that he had slurred speech and generalized weakness, more in his legs (~8:30 AM). pt had weakness in bilateral legs but denied numbness/tingling. Patient then drove home and came to the ED with his wife. This has never occurred before. Wife at bedside and patient report that the slurred speech is slightly worse now. Denies any FNDs and is not currently feeling weak. Patient worked on a crossword puzzle while waiting in the ED and denies any confusion. Denies any history of CVA, MI or A fib. He reports having a DVT in his left leg ~5 years ago and a second DVT 3 years ago for which he has been on Coumadin and has his blood checked once in awhile for the Coumadin. Denies fever, chills, chest pain, palpitations, SOB, cough, abdominal pain, nausea, vomiting, diarrhea, constipation, urinary frequency, urgency, or dysuria, headaches, changes in vision, dizziness, numbness, tingling or other complaints.    In the ED, vitals were T 100.7, HR 82, /79, RR 20, SpO2 96% on RA. Labs showed INR 1.7, PT 19.5, Cl 110, Ca 7.9. Negative U/A and flu testing. CT head negative for hemorrhage/infarct. CT abd/pelvis showed No renal stones, renal masses or evidence of a urothelial lesion. Enlarged prostate. MR head showed Subcentimeter acute/subacute infarct left corona radiata. No acute intracranial hemorrhage. Loss of normal flow void at the distal petrous segment of left internal carotid artery. MRA head was unremarkable. CXR negative for pulm process.

## 2018-12-21 NOTE — H&P ADULT - NSHPREVIEWOFSYSTEMS_GEN_ALL_CORE
CONSTITUTIONAL: denies fever (although febrile in ED), chills, fatigue, weakness  HEENT: denies blurred vision, sore throat  SKIN: denies new lesions, rash  CARDIOVASCULAR: denies chest pain, chest pressure, palpitations  RESPIRATORY: denies shortness of breath, cough, sputum production  GASTROINTESTINAL: denies nausea, vomiting, diarrhea, abdominal pain  GENITOURINARY: denies dysuria, discharge  NEUROLOGICAL: admits slurred speech; denies numbness, tingling, headache, focal weakness  MUSCULOSKELETAL: denies new joint pain, muscle aches  HEMATOLOGIC: denies gross bleeding, bruising  LYMPHATICS: denies enlarged lymph nodes, extremity swelling  PSYCHIATRIC: denies recent changes in anxiety, depression  ENDOCRINOLOGIC: denies sweating, cold or heat intolerance

## 2018-12-21 NOTE — CONSULT NOTE ADULT - SUBJECTIVE AND OBJECTIVE BOX
Clinical impression is cerebrovascular accident.    I would recommend telemetry evaluation to rule out underlying arrhythmia.  I would recommend echocardiogram to evaluate ejection fraction.  I would recommend carotid Doppler's to evaluate for carotid stenosis.  I would recommend to check TSH,lipid panel  and A1C  Avoid hypotension, allow permissive hypertension  AC INR 2-3  I will recommend cholesterol medications.  I will get Physical Therapy and Occupational Therapy.  I would recommend fall precautions.  I will continue to followup.    Spoke with the spouse at the bedside

## 2018-12-21 NOTE — ED ADULT NURSE NOTE - OBJECTIVE STATEMENT
received pt in bed #4b Pt A&O c/o slurred speech x 2 hours  Pt w/ no other symptoms Pt seen by Dr Catalan Will monitor

## 2018-12-21 NOTE — H&P ADULT - NSHPPHYSICALEXAM_GEN_ALL_CORE
T(C): 36.9 (12-21-18 @ 14:35), Max: 38.2 (12-21-18 @ 10:53)  HR: 64 (12-21-18 @ 14:35) (64 - 82)  BP: 135/80 (12-21-18 @ 14:35) (127/79 - 135/80)  RR: 16 (12-21-18 @ 14:35) (16 - 20)  SpO2: 95% (12-21-18 @ 14:35) (95% - 96%)    GENERAL: patient appears well, no acute distress, appropriate, pleasant  EYES: sclera clear, no exudates  ENMT: oropharynx clear without erythema, no exudates, moist mucous membranes  NECK: supple, soft, no thyromegaly noted  LUNGS: good air entry bilaterally, clear to auscultation, symmetric breath sounds, no wheezing or rhonchi appreciated  HEART: soft S1/S2, regular rate and rhythm, no murmurs noted, no lower extremity edema  GASTROINTESTINAL: abdomen is soft, nontender, nondistended, normoactive bowel sounds  INTEGUMENT: warm, well-perfused, good skin turgor  MUSCULOSKELETAL: no clubbing or cyanosis, no obvious deformity  NEUROLOGIC: awake, alert, oriented x3, CN2-12 intact, +slurred speech, good muscle tone in 4 extremities, no obvious sensory deficits  PSYCHIATRIC: mood is good, affect is congruent, linear and logical thought process  HEME/LYMPH: no palpable supraclavicular nodules, no obvious ecchymosis or petechiae

## 2018-12-21 NOTE — ED PROVIDER NOTE - CHPI ED SYMPTOMS NEG
no dizziness/no numbness/no vomiting/no confusion/no loss of consciousness/no fever/no nausea/no change in level of consciousness

## 2018-12-21 NOTE — ED PROVIDER NOTE - OBJECTIVE STATEMENT
72 y/o M with c/o intermittent slurred speech x 2 hours 72 y/o M with c/o intermittent slurred speech x 2 hours while eating breakfast.  Pt is on coumadin for previous DVT.  Pt denies weakness, fevers, numbness.  Wife states speech is slurred on and off.  Pt febrile in the ED.  Pt denies SOB, cough, chest pain, SOB, nausea, vomiting, diarrhaea, urinary symptoms.     PCP: Dr. Weaver

## 2018-12-21 NOTE — H&P ADULT - NSHPSOCIALHISTORY_GEN_ALL_CORE
Patient lives with his wife. He ambulates independently. Patient denies ever smoking and admits occasional alcohol use.

## 2018-12-21 NOTE — H&P ADULT - ASSESSMENT
72 yo M with PMH of HLD, GERD, DVT, BPH and hiatal hernia (s/p repair) presented with slurred speech, subcentimeter acute/subacute infarct left corona radiata found on MRI, admitted for management of CVA.

## 2018-12-21 NOTE — H&P ADULT - PROBLEM SELECTOR PLAN 3
S/p hiatal hernia repair  - Not on any meds S/p hiatal hernia repair approx 3 years ago  - Not on any meds.

## 2018-12-21 NOTE — H&P ADULT - PROBLEM SELECTOR PLAN 6
VTE PPx: On Coumadin VTE PPx: On Coumadin.    IMPROVE VTE Individual Risk Assessment        RISK                                                          Points  [ x ] Previous VTE                                                3  [  ] Thrombophilia                                             2  [  ] Lower limb paralysis                                   2        (unable to hold up >15 seconds)    [  ] Current Cancer                                            2         (within 6 months)  [  ] Immobilization > 24 hrs                             1  [  ] ICU/CCU stay > 24 hours                           1  [ x ] Age > 60                                                       1    IMPROVE VTE Score: 4 VTE PPx: On Coumadin. subtherapeutic.     IMPROVE VTE Individual Risk Assessment        RISK                                                          Points  [ x ] Previous VTE                                                3  [  ] Thrombophilia                                             2  [  ] Lower limb paralysis                                   2        (unable to hold up >15 seconds)    [  ] Current Cancer                                            2         (within 6 months)  [  ] Immobilization > 24 hrs                             1  [  ] ICU/CCU stay > 24 hours                           1  [ x ] Age > 60                                                       1    IMPROVE VTE Score: 4

## 2018-12-21 NOTE — H&P ADULT - PROBLEM SELECTOR PLAN 1
Subcentimeter acute/subacute infarct left corona radiata on MRI head, unremarkable MRA  - Admit to tele and monitor for arrhythmia including A fib.  - Consult cardiology and neurology.  - Patient does not have hx of HTN (BP currently 135/80) but may allow for permissive HTN.  - Continue home meds Atorvastatin and Coumadin. Subcentimeter acute/subacute infarct left corona radiata on MRI head, unremarkable MRA  - Admit to tele and monitor for arrhythmia including A fib.  - Consult neurology [facility]   - Continue home meds Atorvastatin and Coumadin.  - check TFTs, Hemoglobin A1c   - Echo   - bedside speech and swallow   - Aspiration precautions Subcentimeter acute/subacute infarct left corona radiata on MRI head, unremarkable MRA  - Admit to tele and monitor for arrhythmia including A fib.  - [facility]  - Continue home meds Atorvastatin and Coumadin.  - check TFTs, Hemoglobin A1c, lipid profile.  - Echo.  - Carotid dopplers.  - bedside speech and swallow   - Aspiration precautions Subcentimeter acute/subacute infarct left corona radiata on MRI head, unremarkable MRA  - Admit to tele and monitor for arrhythmia including A fib.  - [facility]  - Continue home meds Atorvastatin and Coumadin.  - check TFTs, Hemoglobin A1c, lipid profile.  - Echo.  - Carotid dopplers.  - bedside speech and swallow performed- wnl.   - Aspiration precautions

## 2018-12-21 NOTE — H&P ADULT - PROBLEM SELECTOR PLAN 2
Hx of L LE DVT approx 5 years ago  - Continue Coumadin and monitor INR. Hx of L LE DVT approx 5 years ago and again 3 years ago  - INR subtherapeutic   - continue coumadin 7.5qhs   - monitor INR

## 2018-12-22 LAB
ALBUMIN SERPL ELPH-MCNC: 3.4 G/DL — SIGNIFICANT CHANGE UP (ref 3.3–5)
ALP SERPL-CCNC: 65 U/L — SIGNIFICANT CHANGE UP (ref 40–120)
ALT FLD-CCNC: 19 U/L — SIGNIFICANT CHANGE UP (ref 12–78)
ANION GAP SERPL CALC-SCNC: 5 MMOL/L — SIGNIFICANT CHANGE UP (ref 5–17)
APPEARANCE UR: CLEAR — SIGNIFICANT CHANGE UP
AST SERPL-CCNC: 12 U/L — LOW (ref 15–37)
BASOPHILS # BLD AUTO: 0.04 K/UL — SIGNIFICANT CHANGE UP (ref 0–0.2)
BASOPHILS NFR BLD AUTO: 0.8 % — SIGNIFICANT CHANGE UP (ref 0–2)
BILIRUB SERPL-MCNC: 1 MG/DL — SIGNIFICANT CHANGE UP (ref 0.2–1.2)
BILIRUB UR-MCNC: NEGATIVE — SIGNIFICANT CHANGE UP
BUN SERPL-MCNC: 14 MG/DL — SIGNIFICANT CHANGE UP (ref 7–23)
CALCIUM SERPL-MCNC: 8 MG/DL — LOW (ref 8.5–10.1)
CHLORIDE SERPL-SCNC: 107 MMOL/L — SIGNIFICANT CHANGE UP (ref 96–108)
CHOLEST SERPL-MCNC: 151 MG/DL — SIGNIFICANT CHANGE UP (ref 10–199)
CO2 SERPL-SCNC: 29 MMOL/L — SIGNIFICANT CHANGE UP (ref 22–31)
COLOR SPEC: YELLOW — SIGNIFICANT CHANGE UP
CREAT SERPL-MCNC: 0.81 MG/DL — SIGNIFICANT CHANGE UP (ref 0.5–1.3)
CULTURE RESULTS: SIGNIFICANT CHANGE UP
DIFF PNL FLD: ABNORMAL
EOSINOPHIL # BLD AUTO: 0.33 K/UL — SIGNIFICANT CHANGE UP (ref 0–0.5)
EOSINOPHIL NFR BLD AUTO: 6.8 % — HIGH (ref 0–6)
GLUCOSE SERPL-MCNC: 85 MG/DL — SIGNIFICANT CHANGE UP (ref 70–99)
GLUCOSE UR QL: NEGATIVE — SIGNIFICANT CHANGE UP
HBA1C BLD-MCNC: 5.8 % — HIGH (ref 4–5.6)
HCT VFR BLD CALC: 41.6 % — SIGNIFICANT CHANGE UP (ref 39–50)
HDLC SERPL-MCNC: 43 MG/DL — SIGNIFICANT CHANGE UP
HGB BLD-MCNC: 13.9 G/DL — SIGNIFICANT CHANGE UP (ref 13–17)
IMM GRANULOCYTES NFR BLD AUTO: 0.4 % — SIGNIFICANT CHANGE UP (ref 0–1.5)
INR BLD: 1.68 RATIO — HIGH (ref 0.88–1.16)
KETONES UR-MCNC: ABNORMAL
LEUKOCYTE ESTERASE UR-ACNC: NEGATIVE — SIGNIFICANT CHANGE UP
LIPID PNL WITH DIRECT LDL SERPL: 94 MG/DL — SIGNIFICANT CHANGE UP
LYMPHOCYTES # BLD AUTO: 1.47 K/UL — SIGNIFICANT CHANGE UP (ref 1–3.3)
LYMPHOCYTES # BLD AUTO: 30.1 % — SIGNIFICANT CHANGE UP (ref 13–44)
MCHC RBC-ENTMCNC: 31.4 PG — SIGNIFICANT CHANGE UP (ref 27–34)
MCHC RBC-ENTMCNC: 33.4 GM/DL — SIGNIFICANT CHANGE UP (ref 32–36)
MCV RBC AUTO: 93.9 FL — SIGNIFICANT CHANGE UP (ref 80–100)
MONOCYTES # BLD AUTO: 0.6 K/UL — SIGNIFICANT CHANGE UP (ref 0–0.9)
MONOCYTES NFR BLD AUTO: 12.3 % — SIGNIFICANT CHANGE UP (ref 2–14)
NEUTROPHILS # BLD AUTO: 2.42 K/UL — SIGNIFICANT CHANGE UP (ref 1.8–7.4)
NEUTROPHILS NFR BLD AUTO: 49.6 % — SIGNIFICANT CHANGE UP (ref 43–77)
NITRITE UR-MCNC: NEGATIVE — SIGNIFICANT CHANGE UP
PH UR: 5 — SIGNIFICANT CHANGE UP (ref 5–8)
PLATELET # BLD AUTO: 137 K/UL — LOW (ref 150–400)
POTASSIUM SERPL-MCNC: 3.8 MMOL/L — SIGNIFICANT CHANGE UP (ref 3.5–5.3)
POTASSIUM SERPL-SCNC: 3.8 MMOL/L — SIGNIFICANT CHANGE UP (ref 3.5–5.3)
PROT SERPL-MCNC: 6.3 G/DL — SIGNIFICANT CHANGE UP (ref 6–8.3)
PROT UR-MCNC: NEGATIVE — SIGNIFICANT CHANGE UP
PROTHROM AB SERPL-ACNC: 19.5 SEC — HIGH (ref 10–12.9)
RBC # BLD: 4.43 M/UL — SIGNIFICANT CHANGE UP (ref 4.2–5.8)
RBC # FLD: 12.8 % — SIGNIFICANT CHANGE UP (ref 10.3–14.5)
SODIUM SERPL-SCNC: 141 MMOL/L — SIGNIFICANT CHANGE UP (ref 135–145)
SP GR SPEC: 1.02 — SIGNIFICANT CHANGE UP (ref 1.01–1.02)
SPECIMEN SOURCE: SIGNIFICANT CHANGE UP
T3 SERPL-MCNC: 103 NG/DL — SIGNIFICANT CHANGE UP (ref 80–200)
T4 AB SER-ACNC: 5.9 UG/DL — SIGNIFICANT CHANGE UP (ref 4.6–12)
TOTAL CHOLESTEROL/HDL RATIO MEASUREMENT: 3.5 RATIO — SIGNIFICANT CHANGE UP (ref 3.4–9.6)
TRIGL SERPL-MCNC: 71 MG/DL — SIGNIFICANT CHANGE UP (ref 10–149)
TSH SERPL-MCNC: 0.78 UIU/ML — SIGNIFICANT CHANGE UP (ref 0.36–3.74)
TSH SERPL-MCNC: 0.8 UIU/ML — SIGNIFICANT CHANGE UP (ref 0.36–3.74)
UROBILINOGEN FLD QL: NEGATIVE — SIGNIFICANT CHANGE UP
WBC # BLD: 4.88 K/UL — SIGNIFICANT CHANGE UP (ref 3.8–10.5)
WBC # FLD AUTO: 4.88 K/UL — SIGNIFICANT CHANGE UP (ref 3.8–10.5)

## 2018-12-22 PROCEDURE — 99233 SBSQ HOSP IP/OBS HIGH 50: CPT

## 2018-12-22 RX ORDER — WARFARIN SODIUM 2.5 MG/1
10 TABLET ORAL ONCE
Qty: 0 | Refills: 0 | Status: COMPLETED | OUTPATIENT
Start: 2018-12-22 | End: 2018-12-22

## 2018-12-22 RX ORDER — ENOXAPARIN SODIUM 100 MG/ML
80 INJECTION SUBCUTANEOUS
Qty: 0 | Refills: 0 | Status: DISCONTINUED | OUTPATIENT
Start: 2018-12-22 | End: 2018-12-24

## 2018-12-22 RX ADMIN — ENOXAPARIN SODIUM 80 MILLIGRAM(S): 100 INJECTION SUBCUTANEOUS at 12:09

## 2018-12-22 RX ADMIN — ATORVASTATIN CALCIUM 10 MILLIGRAM(S): 80 TABLET, FILM COATED ORAL at 22:47

## 2018-12-22 RX ADMIN — WARFARIN SODIUM 10 MILLIGRAM(S): 2.5 TABLET ORAL at 22:05

## 2018-12-22 RX ADMIN — ENOXAPARIN SODIUM 80 MILLIGRAM(S): 100 INJECTION SUBCUTANEOUS at 22:05

## 2018-12-22 NOTE — PROGRESS NOTE ADULT - PROBLEM SELECTOR PLAN 6
VTE PPx: On Coumadin. subtherapeutic.     IMPROVE VTE Individual Risk Assessment        RISK                                                          Points  [ x ] Previous VTE                                                3  [  ] Thrombophilia                                             2  [  ] Lower limb paralysis                                   2        (unable to hold up >15 seconds)    [  ] Current Cancer                                            2         (within 6 months)  [  ] Immobilization > 24 hrs                             1  [  ] ICU/CCU stay > 24 hours                           1  [ x ] Age > 60                                                       1    IMPROVE VTE Score: 4

## 2018-12-22 NOTE — PROGRESS NOTE ADULT - PROBLEM SELECTOR PLAN 1
Subcentimeter acute/subacute infarct left corona radiata on MRI head, unremarkable MRA  - Monitor on tele for arrhythmia including A fib.  - Neuro following: [facility].  - Continue home meds Atorvastatin and Coumadin.  - check TFTs, Hemoglobin A1c, lipid profile.  - Echo.  - Carotid dopplers.  - bedside dysphagia screen performed, no signs of aspiration. Formal speech ans swallow eval to follow.   - Aspiration precautions  - PT eval.

## 2018-12-22 NOTE — PROGRESS NOTE ADULT - PROBLEM SELECTOR PLAN 2
Hx of L LE DVT approx 5 years ago and again 3 years ago  - INR subtherapeutic, so given his current CVA, will give Coumadin 10 mg tonight.   - Discussed giving Lovenox until INR therapeutic and neuro wishes to hold off on this for now.   - monitor INR Hx of L LE DVT approx 5 years ago and again 3 years ago  - INR subtherapeutic, so given his current CVA, will give Coumadin 10 mg tonight. Will also give full dose Lovenox until INR therapeutic.   - monitor INR

## 2018-12-23 LAB
ANION GAP SERPL CALC-SCNC: 6 MMOL/L — SIGNIFICANT CHANGE UP (ref 5–17)
APTT BLD: 42.4 SEC — HIGH (ref 27.5–36.3)
BUN SERPL-MCNC: 15 MG/DL — SIGNIFICANT CHANGE UP (ref 7–23)
CALCIUM SERPL-MCNC: 8.3 MG/DL — LOW (ref 8.5–10.1)
CHLORIDE SERPL-SCNC: 108 MMOL/L — SIGNIFICANT CHANGE UP (ref 96–108)
CO2 SERPL-SCNC: 26 MMOL/L — SIGNIFICANT CHANGE UP (ref 22–31)
CREAT SERPL-MCNC: 0.73 MG/DL — SIGNIFICANT CHANGE UP (ref 0.5–1.3)
CULTURE RESULTS: SIGNIFICANT CHANGE UP
GLUCOSE SERPL-MCNC: 93 MG/DL — SIGNIFICANT CHANGE UP (ref 70–99)
HCT VFR BLD CALC: 43.2 % — SIGNIFICANT CHANGE UP (ref 39–50)
HGB BLD-MCNC: 14.5 G/DL — SIGNIFICANT CHANGE UP (ref 13–17)
INR BLD: 1.89 RATIO — HIGH (ref 0.88–1.16)
MCHC RBC-ENTMCNC: 31.3 PG — SIGNIFICANT CHANGE UP (ref 27–34)
MCHC RBC-ENTMCNC: 33.6 GM/DL — SIGNIFICANT CHANGE UP (ref 32–36)
MCV RBC AUTO: 93.1 FL — SIGNIFICANT CHANGE UP (ref 80–100)
NRBC # BLD: 0 /100 WBCS — SIGNIFICANT CHANGE UP (ref 0–0)
PLATELET # BLD AUTO: 140 K/UL — LOW (ref 150–400)
POTASSIUM SERPL-MCNC: 4 MMOL/L — SIGNIFICANT CHANGE UP (ref 3.5–5.3)
POTASSIUM SERPL-SCNC: 4 MMOL/L — SIGNIFICANT CHANGE UP (ref 3.5–5.3)
PROTHROM AB SERPL-ACNC: 21.8 SEC — HIGH (ref 10–12.9)
RBC # BLD: 4.64 M/UL — SIGNIFICANT CHANGE UP (ref 4.2–5.8)
RBC # FLD: 12.9 % — SIGNIFICANT CHANGE UP (ref 10.3–14.5)
SODIUM SERPL-SCNC: 140 MMOL/L — SIGNIFICANT CHANGE UP (ref 135–145)
SPECIMEN SOURCE: SIGNIFICANT CHANGE UP
WBC # BLD: 4.27 K/UL — SIGNIFICANT CHANGE UP (ref 3.8–10.5)
WBC # FLD AUTO: 4.27 K/UL — SIGNIFICANT CHANGE UP (ref 3.8–10.5)

## 2018-12-23 PROCEDURE — 93880 EXTRACRANIAL BILAT STUDY: CPT | Mod: 26

## 2018-12-23 PROCEDURE — 93306 TTE W/DOPPLER COMPLETE: CPT | Mod: 26

## 2018-12-23 PROCEDURE — 99233 SBSQ HOSP IP/OBS HIGH 50: CPT | Mod: GC

## 2018-12-23 RX ORDER — WARFARIN SODIUM 2.5 MG/1
8 TABLET ORAL ONCE
Qty: 0 | Refills: 0 | Status: COMPLETED | OUTPATIENT
Start: 2018-12-23 | End: 2018-12-23

## 2018-12-23 RX ADMIN — ENOXAPARIN SODIUM 80 MILLIGRAM(S): 100 INJECTION SUBCUTANEOUS at 21:18

## 2018-12-23 RX ADMIN — ENOXAPARIN SODIUM 80 MILLIGRAM(S): 100 INJECTION SUBCUTANEOUS at 10:58

## 2018-12-23 RX ADMIN — WARFARIN SODIUM 8 MILLIGRAM(S): 2.5 TABLET ORAL at 21:17

## 2018-12-23 RX ADMIN — ATORVASTATIN CALCIUM 10 MILLIGRAM(S): 80 TABLET, FILM COATED ORAL at 21:17

## 2018-12-23 NOTE — PROGRESS NOTE ADULT - PROBLEM SELECTOR PLAN 2
Hx of L LE DVT approx 5 years ago and again 3 years ago  - INR subtherapeutic, so given his current CVA,  Will also give full dose Lovenox until INR therapeutic.   - monitor INR daily.

## 2018-12-23 NOTE — PROGRESS NOTE ADULT - PROBLEM SELECTOR PLAN 6
VTE PPx: On Coumadin. subtherapeutic / lovenox .     IMPROVE VTE Individual Risk Assessment        RISK                                                          Points  [ x ] Previous VTE                                                3  [  ] Thrombophilia                                             2  [  ] Lower limb paralysis                                   2        (unable to hold up >15 seconds)    [  ] Current Cancer                                            2         (within 6 months)  [  ] Immobilization > 24 hrs                             1  [  ] ICU/CCU stay > 24 hours                           1  [ x ] Age > 60                                                       1    IMPROVE VTE Score: 4

## 2018-12-23 NOTE — PROGRESS NOTE ADULT - ASSESSMENT
72 yo M with PMH of HLD, GERD, DVT, BPH and hiatal hernia (s/p repair) presented with slurred speech, subcentimeter acute/subacute infarct left corona radiata found on MRI, admitted for   CVA.

## 2018-12-23 NOTE — SWALLOW BEDSIDE ASSESSMENT ADULT - SWALLOW EVAL: RECOMMENDED FEEDING/EATING TECHNIQUES
alternate food with liquid/hard swallow w/ each bite or sip/crush medication (when feasible)/maintain upright posture during/after eating for 30 mins

## 2018-12-23 NOTE — PROGRESS NOTE ADULT - NSHPATTENDINGPLANDISCUSS_GEN_ALL_CORE
patient and wife re: treatment plan going forward, need for further testing, echo, carotids, anticoagulation.
pt / nurse / family

## 2018-12-23 NOTE — PROGRESS NOTE ADULT - PROBLEM SELECTOR PLAN 1
Subcentimeter acute/subacute infarct left corona radiata on MRI head, unremarkable MRA  - Monitor on tele for arrhythmia  no event so far   - Neuro following: [facility] covering [facility].  - Continue home meds Atorvastatin and Coumadin.  - Echo see above report   - Carotid dopplers neg  for stenosis   - bedside dysphagia screen performed, no signs of aspiration / seen by speech and swallow on diet   - Aspiration precautions  - PT eval.

## 2018-12-23 NOTE — SWALLOW BEDSIDE ASSESSMENT ADULT - COMMENTS
74 yo M with PMH of HLD, GERD, multiple DVTs, BPH and hiatal hernia admitted r/o CVA with slurred speech and generalized weakness.  Pt awake, alert , A and 0 x 4, mild to moderate dysarthia of speech at conversational level.   Pt tolerated regular, soft, chopped, puree and thin liquids  Pt c timely trigger of swallow, adequate hyolaryngeal elevation. No overt si/sx of aspiration 74 yo M with PMH of HLD, GERD, multiple DVTs, BPH and hiatal hernia admitted r/o CVA with slurred speech and generalized weakness.  Pt awake, alert , A and 0 x 4, mild to moderate dysarthia of speech at conversational level.   Pt tolerated regular, soft, chopped, puree and thin liquids  Pt c timely trigger of swallow, adequate hyolaryngeal elevation. No overt si/sx of aspiration  introductory speech letter left bedside. Spoke c Pt regards to out patient speech tx services for dysarthria

## 2018-12-24 ENCOUNTER — TRANSCRIPTION ENCOUNTER (OUTPATIENT)
Age: 73
End: 2018-12-24

## 2018-12-24 VITALS
RESPIRATION RATE: 17 BRPM | OXYGEN SATURATION: 95 % | WEIGHT: 147.71 LBS | SYSTOLIC BLOOD PRESSURE: 116 MMHG | TEMPERATURE: 97 F | DIASTOLIC BLOOD PRESSURE: 72 MMHG | HEART RATE: 66 BPM

## 2018-12-24 LAB
ANION GAP SERPL CALC-SCNC: 6 MMOL/L — SIGNIFICANT CHANGE UP (ref 5–17)
BUN SERPL-MCNC: 13 MG/DL — SIGNIFICANT CHANGE UP (ref 7–23)
CALCIUM SERPL-MCNC: 8 MG/DL — LOW (ref 8.5–10.1)
CHLORIDE SERPL-SCNC: 106 MMOL/L — SIGNIFICANT CHANGE UP (ref 96–108)
CO2 SERPL-SCNC: 29 MMOL/L — SIGNIFICANT CHANGE UP (ref 22–31)
CREAT SERPL-MCNC: 0.81 MG/DL — SIGNIFICANT CHANGE UP (ref 0.5–1.3)
GLUCOSE SERPL-MCNC: 87 MG/DL — SIGNIFICANT CHANGE UP (ref 70–99)
INR BLD: 2.64 RATIO — HIGH (ref 0.88–1.16)
POTASSIUM SERPL-MCNC: 4 MMOL/L — SIGNIFICANT CHANGE UP (ref 3.5–5.3)
POTASSIUM SERPL-SCNC: 4 MMOL/L — SIGNIFICANT CHANGE UP (ref 3.5–5.3)
PROTHROM AB SERPL-ACNC: 31 SEC — HIGH (ref 10–12.9)
SODIUM SERPL-SCNC: 141 MMOL/L — SIGNIFICANT CHANGE UP (ref 135–145)

## 2018-12-24 PROCEDURE — 83605 ASSAY OF LACTIC ACID: CPT

## 2018-12-24 PROCEDURE — 84436 ASSAY OF TOTAL THYROXINE: CPT

## 2018-12-24 PROCEDURE — 80048 BASIC METABOLIC PNL TOTAL CA: CPT

## 2018-12-24 PROCEDURE — 99285 EMERGENCY DEPT VISIT HI MDM: CPT | Mod: 25

## 2018-12-24 PROCEDURE — 80061 LIPID PANEL: CPT

## 2018-12-24 PROCEDURE — 87040 BLOOD CULTURE FOR BACTERIA: CPT

## 2018-12-24 PROCEDURE — 70544 MR ANGIOGRAPHY HEAD W/O DYE: CPT

## 2018-12-24 PROCEDURE — 93306 TTE W/DOPPLER COMPLETE: CPT

## 2018-12-24 PROCEDURE — 97162 PT EVAL MOD COMPLEX 30 MIN: CPT

## 2018-12-24 PROCEDURE — 36415 COLL VENOUS BLD VENIPUNCTURE: CPT

## 2018-12-24 PROCEDURE — 93005 ELECTROCARDIOGRAM TRACING: CPT

## 2018-12-24 PROCEDURE — 99239 HOSP IP/OBS DSCHRG MGMT >30: CPT

## 2018-12-24 PROCEDURE — 81001 URINALYSIS AUTO W/SCOPE: CPT

## 2018-12-24 PROCEDURE — 83036 HEMOGLOBIN GLYCOSYLATED A1C: CPT

## 2018-12-24 PROCEDURE — 80053 COMPREHEN METABOLIC PANEL: CPT

## 2018-12-24 PROCEDURE — 85610 PROTHROMBIN TIME: CPT

## 2018-12-24 PROCEDURE — 70450 CT HEAD/BRAIN W/O DYE: CPT

## 2018-12-24 PROCEDURE — 81003 URINALYSIS AUTO W/O SCOPE: CPT

## 2018-12-24 PROCEDURE — 85027 COMPLETE CBC AUTOMATED: CPT

## 2018-12-24 PROCEDURE — 84480 ASSAY TRIIODOTHYRONINE (T3): CPT

## 2018-12-24 PROCEDURE — 87631 RESP VIRUS 3-5 TARGETS: CPT

## 2018-12-24 PROCEDURE — 71045 X-RAY EXAM CHEST 1 VIEW: CPT

## 2018-12-24 PROCEDURE — 93880 EXTRACRANIAL BILAT STUDY: CPT

## 2018-12-24 PROCEDURE — 70551 MRI BRAIN STEM W/O DYE: CPT

## 2018-12-24 PROCEDURE — 87086 URINE CULTURE/COLONY COUNT: CPT

## 2018-12-24 PROCEDURE — 85730 THROMBOPLASTIN TIME PARTIAL: CPT

## 2018-12-24 PROCEDURE — 84443 ASSAY THYROID STIM HORMONE: CPT

## 2018-12-24 RX ORDER — WARFARIN SODIUM 2.5 MG/1
1 TABLET ORAL
Qty: 7 | Refills: 0
Start: 2018-12-24 | End: 2018-12-30

## 2018-12-24 RX ADMIN — ENOXAPARIN SODIUM 80 MILLIGRAM(S): 100 INJECTION SUBCUTANEOUS at 11:10

## 2018-12-24 NOTE — DISCHARGE NOTE ADULT - MEDICATION SUMMARY - MEDICATIONS TO TAKE
I will START or STAY ON the medications listed below when I get home from the hospital:    speech therapist out pt for slurred speech with new cva   -- Indication: For Cerebrovascular accident (CVA), unspecified mechanism    Coumadin 5 mg oral tablet  -- 1 tab(s) by mouth once a day  -- Indication: For Cerebrovascular accident (CVA), unspecified mechanism    warfarin 3 mg oral tablet  -- 1 tab(s) by mouth once a day   -- Do not take this drug if you are pregnant.  It is very important that you take or use this exactly as directed.  Do not skip doses or discontinue unless directed by your doctor.  Obtain medical advice before taking any non-prescription drugs as some may affect the action of this medication.    -- Indication: For Cerebrovascular accident (CVA), unspecified mechanism    atorvastatin 10 mg oral tablet  -- 1 tab(s) by mouth once a day  -- Indication: For Cerebrovascular accident (CVA), unspecified mechanism

## 2018-12-24 NOTE — DISCHARGE NOTE ADULT - PLAN OF CARE
on coumadin  8 mg  po daily  , on statin fu inr closely need to keep 2 to 3 , fu inr Wednesday your pmd office to readjust coumadin dose as needed . hx chr  on coumadin fu inr closely keep 2 to 3. low cholesterol diet on meds / fu out pt repeat lipid profile  with  in 1 month .

## 2018-12-24 NOTE — DISCHARGE NOTE ADULT - CARE PLAN
Principal Discharge DX:	Cerebrovascular accident (CVA), unspecified mechanism  Goal:	on coumadin  8 mg  po daily  , on statin  Assessment and plan of treatment:	fu inr closely need to keep 2 to 3 , fu inr Wednesday your pmd office to readjust coumadin dose as needed .  Secondary Diagnosis:	DVT (deep venous thrombosis)  Goal:	hx chr  on coumadin  Assessment and plan of treatment:	fu inr closely keep 2 to 3.  Secondary Diagnosis:	HLD (hyperlipidemia)  Goal:	low cholesterol diet  Assessment and plan of treatment:	on meds / fu out pt repeat lipid profile  with  in 1 month .

## 2018-12-24 NOTE — DISCHARGE NOTE ADULT - CARE PROVIDER_API CALL
Mary Weaver), Internal Medicine; Nephrology  [address] [place] [address]  Phone: [phone]  Fax: [phone]

## 2018-12-24 NOTE — DISCHARGE NOTE ADULT - ADDITIONAL INSTRUCTIONS
your coumadin increase from 7.5 mg to 8 mg po daily  / please fu inr Wednesday with your pmd and adjust dose as needed need to keep inr 2 to 3 with acute cva this time . fu with pmd office   Mary Weaver), Internal Medicine; Nephrology  [address] [place] [address]  Phone: [phone]  Fax: [phone]  inr Wednesday keep inr 2 to 3.

## 2018-12-24 NOTE — DISCHARGE NOTE ADULT - PATIENT PORTAL LINK FT
You can access the [facility] Patient Portal, offered by [facility], by registering with the following website: [email or web address]

## 2018-12-24 NOTE — PROGRESS NOTE ADULT - SUBJECTIVE AND OBJECTIVE BOX
Neurology Follow up note    EVELIN NAJERA73yMale    HPI:  74 yo M with PMH of HLD, GERD, multiple DVTs, BPH and hiatal hernia (s/p repair) presents with slurred speech. He reports that he was out at breakfast this morning, sitting and eating when he noticed that he had slurred speech and generalized weakness, more in his legs (~8:30 AM). pt had weakness in bilateral legs but denied numbness/tingling. Patient then drove home and came to the ED with his wife. This has never occurred before. Wife at bedside and patient report that the slurred speech is slightly worse now. Denies any FNDs and is not currently feeling weak. Patient worked on a crossword puzzle while waiting in the ED and denies any confusion. Denies any history of CVA, MI or A fib. He reports having a DVT in his left leg ~5 years ago and a second DVT 3 years ago for which he has been on Coumadin and has his blood checked once in awhile for the Coumadin. Denies fever, chills, chest pain, palpitations, SOB, cough, abdominal pain, nausea, vomiting, diarrhea, constipation, urinary frequency, urgency, or dysuria, headaches, changes in vision, dizziness, numbness, tingling or other complaints.    In the ED, vitals were T 100.7, HR 82, /79, RR 20, SpO2 96% on RA. Labs showed INR 1.7, PT 19.5, Cl 110, Ca 7.9. Negative U/A and flu testing. CT head negative for hemorrhage/infarct. CT abd/pelvis showed No renal stones, renal masses or evidence of a urothelial lesion. Enlarged prostate. MR head showed Subcentimeter acute/subacute infarct left corona radiata. No acute intracranial hemorrhage. Loss of normal flow void at the distal petrous segment of left internal carotid artery. MRA head was unremarkable. CXR negative for pulm process. (21 Dec 2018 14:29)      Interval History - doing well,.    Patient is seen, chart was reviewed and case was discussed with the treatment team.  Pt is not in any distress.   Lying on bed comfortably.   No events reported overnight.   No clinical seizure was reported.  Sitting on chair bed comfortably.    is at bedside.    Vital Signs Last 24 Hrs  T(C): 36.8 (22 Dec 2018 16:39), Max: 36.8 (22 Dec 2018 16:39)  T(F): 98.2 (22 Dec 2018 16:39), Max: 98.2 (22 Dec 2018 16:39)  HR: 68 (22 Dec 2018 16:39) (64 - 76)  BP: 141/87 (22 Dec 2018 16:39) (115/70 - 141/87)  BP(mean): 85 (22 Dec 2018 05:50) (85 - 85)  RR: 17 (22 Dec 2018 16:39) (16 - 18)  SpO2: 93% (22 Dec 2018 16:39) (93% - 96%)        REVIEW OF SYSTEMS:    Constitutional: No fever, weight loss or fatigue  Eyes: No eye pain, visual disturbances, or discharge  ENT:  No difficulty hearing, tinnitus, vertigo; No sinus or throat pain  Neck: No pain or stiffness  Respiratory: No cough, wheezing, chills or hemoptysis  Cardiovascular: No chest pain, palpitations, shortness of breath, dizziness or leg swelling  Gastrointestinal: No abdominal or epigastric pain. No nausea, vomiting or hematemesis; No diarrhea or constipation. No melena or hematochezia.  Genitourinary: No dysuria, frequency, hematuria or incontinence  Neurological: No headaches, memory loss, loss of strength, numbness or tremors  Psychiatric: No depression, anxiety, mood swings or difficulty sleeping  Musculoskeletal: No joint pain or swelling; No muscle, back or extremity pain  Skin: No itching, burning, rashes or lesions   Lymph Nodes: No enlarged glands  Endocrine: No heat or cold intolerance; No hair loss, No h/o diabetes or thyroid dysfunction  Allergy and Immunologic: No hives or eczema    On Neurological Examination:    Mental Status - Pt is alert, awake, oriented X3.  Follows commands well and able to answer questions appropriately.Mood and affect  normal    Speech -dysarthria.    Cranial Nerves - Pupils 3 mm equal and reactive to light, extraocular eye movements intact. Pt has no visual field deficit.  Pt has  right  facial asymmetry. Facial sensation is intact.Tongue - is in midline.    Muscle tone - is normal all over. Moves all extremities equally. No asymmetry is seen.      Motor Exam - 5/5 all over, No drift. No shaking or tremors.    Sensory Exam - Pin prick, temperature, joint position and vibration are intact on either side. Pt withdraws all extremities equally on stimulation. No asymmetry seen. No complaints of tingling, numbness.      coordination:    Finger to nose: normal.    Deep tendon Reflexes - 2 plus all over.          Neck Supple -  Yes.     MEDICATIONS    atorvastatin 10 milliGRAM(s) Oral at bedtime  enoxaparin Injectable 80 milliGRAM(s) SubCutaneous two times a day  warfarin 10 milliGRAM(s) Oral once      Allergies    No Known Allergies    Intolerances        LABS:  CBC Full  -  ( 22 Dec 2018 05:52 )  WBC Count : 4.88 K/uL  Hemoglobin : 13.9 g/dL  Hematocrit : 41.6 %  Platelet Count - Automated : 137 K/uL  Mean Cell Volume : 93.9 fl  Mean Cell Hemoglobin : 31.4 pg  Mean Cell Hemoglobin Concentration : 33.4 gm/dL  Auto Neutrophil # : 2.42 K/uL  Auto Lymphocyte # : 1.47 K/uL  Auto Monocyte # : 0.60 K/uL  Auto Eosinophil # : 0.33 K/uL  Auto Basophil # : 0.04 K/uL  Auto Neutrophil % : 49.6 %  Auto Lymphocyte % : 30.1 %  Auto Monocyte % : 12.3 %  Auto Eosinophil % : 6.8 %  Auto Basophil % : 0.8 %    Urinalysis Basic - ( 22 Dec 2018 12:22 )    Color: Yellow / Appearance: Clear / S.020 / pH: x  Gluc: x / Ketone: Moderate  / Bili: Negative / Urobili: Negative   Blood: x / Protein: Negative / Nitrite: Negative   Leuk Esterase: Negative / RBC: >50 /HPF / WBC 0-2   Sq Epi: x / Non Sq Epi: x / Bacteria: x          141  |  107  |  14  ----------------------------<  85  3.8   |  29  |  0.81    Ca    8.0<L>      22 Dec 2018 05:52    TPro  6.3  /  Alb  3.4  /  TBili  1.0  /  DBili  x   /  AST  12<L>  /  ALT  19  /  AlkPhos  65      Hemoglobin A1C: Hemoglobin A1C, Whole Blood: 5.8 % ( @ 09:57)    Lipid Panel  @ 09:56  Total Cholesterol, Serum 151  LDL 94  Triglycerides 71    Vitamin B12     RADIOLOGY    ASSESSMENT AND PLAN:      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  FOR CAROTID DOPPLER,  SPEECH FOLLOW UP,  Physical therapy evaluation.  OOB to chair/ambulation with assistance only.  Pain is accessed and addressed.  Plan of care was discussed with family. Questions answered.  Would continue to follow.
ADMISSION HPI:  74 yo M with PMH of HLD, GERD, multiple DVTs, BPH and hiatal hernia (s/p repair) presents with slurred speech. He reports that he was out at breakfast this morning, sitting and eating when he noticed that he had slurred speech and generalized weakness, more in his legs (~8:30 AM). pt had weakness in bilateral legs but denied numbness/tingling. Patient then drove home and came to the ED with his wife. This has never occurred before. Wife at bedside and patient report that the slurred speech is slightly worse now. Denies any FNDs and is not currently feeling weak. Patient worked on a crossword puzzle while waiting in the ED and denies any confusion. Denies any history of CVA, MI or A fib. He reports having a DVT in his left leg ~5 years ago and a second DVT 3 years ago for which he has been on Coumadin and has his blood checked once in awhile for the Coumadin. Denies fever, chills, chest pain, palpitations, SOB, cough, abdominal pain, nausea, vomiting, diarrhea, constipation, urinary frequency, urgency, or dysuria, headaches, changes in vision, dizziness, numbness, tingling or other complaints.    In the ED, vitals were T 100.7, HR 82, /79, RR 20, SpO2 96% on RA. Labs showed INR 1.7, PT 19.5, Cl 110, Ca 7.9. Negative U/A and flu testing. CT head negative for hemorrhage/infarct. CT abd/pelvis showed No renal stones, renal masses or evidence of a urothelial lesion. Enlarged prostate. MR head showed Subcentimeter acute/subacute infarct left corona radiata. No acute intracranial hemorrhage. Loss of normal flow void at the distal petrous segment of left internal carotid artery. MRA head was unremarkable. CXR negative for pulm process. (21 Dec 2018 14:29)    INTERVAL HPI:   Patient seen and examined. Still with slurred speech. Denies and weakness or difficulty swallowing. He denies chest pain, SOB, palpitations. Complains of burning when urinating.    REVIEW OF SYSTEMS:    CONSTITUTIONAL: No weakness, fevers or chills  EYES/ENT: No visual changes, no throat pain   RESPIRATORY: No cough, wheezing, hemoptysis; No shortness of breath  CARDIOVASCULAR: No chest pain or palpitations  GASTROINTESTINAL: No abdominal pain, nausea, vomiting, or diarrhea   GENITOURINARY: +dysuria, ferequency, no hematuria  NEUROLOGICAL: No dizziness, numbness, or weakness  SKIN: No itching, burning, rashes, or lesions   All other review of systems is negative unless indicated above.    VITAL SIGNS:  Vital Signs Last 24 Hrs  T(C): 36.7 (12-22-18 @ 09:01), Max: 38.2 (12-21-18 @ 10:53)  T(F): 98 (12-22-18 @ 09:01), Max: 100.7 (12-21-18 @ 10:53)  HR: 64 (12-22-18 @ 09:01) (64 - 82)  BP: 130/86 (12-22-18 @ 09:01) (115/70 - 156/94)  BP(mean): 85 (12-22-18 @ 05:50) (85 - 85)  RR: 16 (12-22-18 @ 09:01) (16 - 20)  SpO2: 96% (12-22-18 @ 09:01) (94% - 97%)      PHYSICAL EXAM:     GENERAL: no acute distress  HEENT: NC/AT, EOMI, neck supple, MMM  RESPIRATORY: LCTAB/L, no rhonchi, rales, or wheezing  CARDIOVASCULAR: RRR, no murmurs, gallops, rubs  ABDOMINAL: soft, non-tender, non-distended, positive bowel sounds   EXTREMITIES: no clubbing, cyanosis, or edema  NEUROLOGICAL: alert and oriented x 3, slurred speech, otherwise non-focal exam.  SKIN: warm, dry, intact  MUSCULOSKELETAL: no gross joint deformity                          13.9   4.88  )-----------( 137      ( 22 Dec 2018 05:52 )             41.6     12-22    141  |  107  |  14  ----------------------------<  85  3.8   |  29  |  0.81    Ca    8.0<L>      22 Dec 2018 05:52    TPro  6.3  /  Alb  3.4  /  TBili  1.0  /  DBili  x   /  AST  12<L>  /  ALT  19  /  AlkPhos  65  12-22    PT/INR - ( 22 Dec 2018 05:52 )   PT: 19.5 sec;   INR: 1.68 ratio         PTT - ( 21 Dec 2018 11:32 )  PTT:34.7 sec      MEDICATIONS  (STANDING):  atorvastatin 10 milliGRAM(s) Oral at bedtime  enoxaparin Injectable 80 milliGRAM(s) SubCutaneous two times a day  warfarin 10 milliGRAM(s) Oral once    MEDICATIONS  (PRN):
Neurology Follow up note    EVELIN NAJERA73yMale    HPI:  72 yo M with PMH of HLD, GERD, multiple DVTs, BPH and hiatal hernia (s/p repair) presents with slurred speech. He reports that he was out at breakfast this morning, sitting and eating when he noticed that he had slurred speech and generalized weakness, more in his legs (~8:30 AM). pt had weakness in bilateral legs but denied numbness/tingling. Patient then drove home and came to the ED with his wife. This has never occurred before. Wife at bedside and patient report that the slurred speech is slightly worse now. Denies any FNDs and is not currently feeling weak. Patient worked on a crossword puzzle while waiting in the ED and denies any confusion. Denies any history of CVA, MI or A fib. He reports having a DVT in his left leg ~5 years ago and a second DVT 3 years ago for which he has been on Coumadin and has his blood checked once in awhile for the Coumadin. Denies fever, chills, chest pain, palpitations, SOB, cough, abdominal pain, nausea, vomiting, diarrhea, constipation, urinary frequency, urgency, or dysuria, headaches, changes in vision, dizziness, numbness, tingling or other complaints.    In the ED, vitals were T 100.7, HR 82, /79, RR 20, SpO2 96% on RA. Labs showed INR 1.7, PT 19.5, Cl 110, Ca 7.9. Negative U/A and flu testing. CT head negative for hemorrhage/infarct. CT abd/pelvis showed No renal stones, renal masses or evidence of a urothelial lesion. Enlarged prostate. MR head showed Subcentimeter acute/subacute infarct left corona radiata. No acute intracranial hemorrhage. Loss of normal flow void at the distal petrous segment of left internal carotid artery. MRA head was unremarkable. CXR negative for pulm process. (21 Dec 2018 14:29)      Interval History -speech improving.    Patient is seen, chart was reviewed and case was discussed with the treatment team.  Pt is not in any distress.   Lying on bed comfortably.   No events reported overnight.   No clinical seizure was reported.  Sitting on chair bed comfortably.    is at bedside.    Vital Signs Last 24 Hrs  T(C): 36.3 (24 Dec 2018 04:39), Max: 37.1 (24 Dec 2018 00:03)  T(F): 97.4 (24 Dec 2018 04:39), Max: 98.7 (24 Dec 2018 00:03)  HR: 66 (24 Dec 2018 04:39) (66 - 76)  BP: 116/72 (24 Dec 2018 04:39) (116/72 - 147/96)  BP(mean): --  RR: 17 (24 Dec 2018 04:39) (17 - 18)  SpO2: 95% (24 Dec 2018 04:39) (94% - 95%)        REVIEW OF SYSTEMS:    Constitutional: No fever, weight loss or fatigue  Eyes: No eye pain, visual disturbances, or discharge  ENT:  No difficulty hearing, tinnitus, vertigo; No sinus or throat pain  Neck: No pain or stiffness  Respiratory: No cough, wheezing, chills or hemoptysis  Cardiovascular: No chest pain, palpitations, shortness of breath, dizziness or leg swelling  Gastrointestinal: No abdominal or epigastric pain. No nausea, vomiting or hematemesis; No diarrhea or constipation. No melena or hematochezia.  Genitourinary: No dysuria, frequency, hematuria or incontinence  Neurological: No headaches, memory loss, loss of strength, numbness or tremors  Psychiatric: No depression, anxiety, mood swings or difficulty sleeping  Musculoskeletal: No joint pain or swelling; No muscle, back or extremity pain  Skin: No itching, burning, rashes or lesions   Lymph Nodes: No enlarged glands  Endocrine: No heat or cold intolerance; No hair loss, No h/o diabetes or thyroid dysfunction  Allergy and Immunologic: No hives or eczema    On Neurological Examination:    Mental Status - Pt is alert, awake, oriented X3.  Follows commands well and able to answer questions appropriately.Mood and affect  normal    Speech -dysarthria.    Cranial Nerves - Pupils 3 mm equal and reactive to light, extraocular eye movements intact. Pt has no visual field deficit.  Pt has  no  facial asymmetry. Facial sensation is intact.Tongue - is in midline.    Muscle tone - is normal all over. Moves all extremities equally. No asymmetry is seen.      Motor Exam - 5/5 all over, No drift. No shaking or tremors.    Sensory Exam - Pin prick, temperature, joint position and vibration are intact on either side. Pt withdraws all extremities equally on stimulation. No asymmetry seen. No complaints of tingling, numbness.      coordination:    Finger to nose: normal.    Deep tendon Reflexes - 2 plus all over.          Neck Supple -  Yes.     MEDICATIONS    atorvastatin 10 milliGRAM(s) Oral at bedtime  enoxaparin Injectable 80 milliGRAM(s) SubCutaneous two times a day  warfarin 10 milliGRAM(s) Oral once      Allergies    No Known Allergies    Intolerances            Hemoglobin A1C: Hemoglobin A1C, Whole Blood: 5.8 % (12-22 @ 09:57)    Lipid Panel 12-22 @ 09:56  Total Cholesterol, Serum 151  LDL 94  Triglycerides 71    Vitamin B12     RADIOLOGY  < from: US Duplex Carotid Arteries Complete, Bilateral (12.23.18 @ 11:01) >    EXAM:  US DPLX CAROTIDS COMPL BI                            PROCEDURE DATE:  12/23/2018          INTERPRETATION:  CLINICAL STATEMENT: Evaluate for carotid artery   stenosis.   CVA/TIA.    TECHNIQUE: Carotid artery ultrasound was performed. [employer]   was utilized.    COMPARISON: None.    FINDINGS: There is plaque in the carotid bulb bilaterally. There is also   lack in the left proximal ICA.    There is no significant narrowing in the visualized common carotid and   internal carotid arteries.    Peak systolic velocity measurements in centimeters per second as   described below.    RIGHT:    CCA: 67  ICA: 74  ICA/CCA ratio: 1.1  There is antegrade flow in the right vertebral artery.    LEFT:    CCA: 82  ICA: 79  ICA/CCA ratio: 1.0  There is antegrade flow in the left vertebral artery.    IMPRESSION:    No evidence of hemodynamically significant stenosis by velocity   measurements.    Measurement of carotid stenosis is based on velocity parameters that   correlate the residual internal carotid diameter with that of the more   distal vessel in accordance with a method such as the [place] American   Symptomatic Carotid Endarterectomy Trial (NASCET).                 DIONTE MCDUFFIE M.D.,ATTENDING RADIOLOGIST  This document has beenelectronically signed. 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  NEURO WISE STABLE FOR DC.  FAVIOLA MUHAMMAD.  Physical therapy evaluation.  OOB to chair/ambulation with assistance only.  Pain is accessed and addressed.  Plan of care was discussed with family. Questions answered.
Neurology Follow up note    EVELIN NAJERA73yMale    HPI:  74 yo M with PMH of HLD, GERD, multiple DVTs, BPH and hiatal hernia (s/p repair) presents with slurred speech. He reports that he was out at breakfast this morning, sitting and eating when he noticed that he had slurred speech and generalized weakness, more in his legs (~8:30 AM). pt had weakness in bilateral legs but denied numbness/tingling. Patient then drove home and came to the ED with his wife. This has never occurred before. Wife at bedside and patient report that the slurred speech is slightly worse now. Denies any FNDs and is not currently feeling weak. Patient worked on a crossword puzzle while waiting in the ED and denies any confusion. Denies any history of CVA, MI or A fib. He reports having a DVT in his left leg ~5 years ago and a second DVT 3 years ago for which he has been on Coumadin and has his blood checked once in awhile for the Coumadin. Denies fever, chills, chest pain, palpitations, SOB, cough, abdominal pain, nausea, vomiting, diarrhea, constipation, urinary frequency, urgency, or dysuria, headaches, changes in vision, dizziness, numbness, tingling or other complaints.    In the ED, vitals were T 100.7, HR 82, /79, RR 20, SpO2 96% on RA. Labs showed INR 1.7, PT 19.5, Cl 110, Ca 7.9. Negative U/A and flu testing. CT head negative for hemorrhage/infarct. CT abd/pelvis showed No renal stones, renal masses or evidence of a urothelial lesion. Enlarged prostate. MR head showed Subcentimeter acute/subacute infarct left corona radiata. No acute intracranial hemorrhage. Loss of normal flow void at the distal petrous segment of left internal carotid artery. MRA head was unremarkable. CXR negative for pulm process. (21 Dec 2018 14:29)      Interval History - slight improvement of speech    Patient is seen, chart was reviewed and case was discussed with the treatment team.  Pt is not in any distress.   Lying on bed comfortably.   No events reported overnight.   No clinical seizure was reported.  Sitting on chair bed comfortably.    is at bedside.    Vital Signs Last 24 Hrs  T(C): 36.6 (23 Dec 2018 11:29), Max: 37.1 (22 Dec 2018 21:53)  T(F): 97.8 (23 Dec 2018 11:29), Max: 98.8 (22 Dec 2018 21:53)  HR: 69 (23 Dec 2018 11:29) (64 - 69)  BP: 144/92 (23 Dec 2018 11:29) (143/94 - 159/92)  BP(mean): --  RR: 17 (23 Dec 2018 11:29) (17 - 20)  SpO2: 97% (23 Dec 2018 11:) (94% - 97%)        REVIEW OF SYSTEMS:    Constitutional: No fever, weight loss or fatigue  Eyes: No eye pain, visual disturbances, or discharge  ENT:  No difficulty hearing, tinnitus, vertigo; No sinus or throat pain  Neck: No pain or stiffness  Respiratory: No cough, wheezing, chills or hemoptysis  Cardiovascular: No chest pain, palpitations, shortness of breath, dizziness or leg swelling  Gastrointestinal: No abdominal or epigastric pain. No nausea, vomiting or hematemesis; No diarrhea or constipation. No melena or hematochezia.  Genitourinary: No dysuria, frequency, hematuria or incontinence  Neurological: No headaches, memory loss, loss of strength, numbness or tremors  Psychiatric: No depression, anxiety, mood swings or difficulty sleeping  Musculoskeletal: No joint pain or swelling; No muscle, back or extremity pain  Skin: No itching, burning, rashes or lesions   Lymph Nodes: No enlarged glands  Endocrine: No heat or cold intolerance; No hair loss, No h/o diabetes or thyroid dysfunction  Allergy and Immunologic: No hives or eczema    On Neurological Examination:    Mental Status - Pt is alert, awake, oriented X3.  Follows commands well and able to answer questions appropriately.Mood and affect  normal    Speech -dysarthria.    Cranial Nerves - Pupils 3 mm equal and reactive to light, extraocular eye movements intact. Pt has no visual field deficit.  Pt has  right  facial asymmetry. Facial sensation is intact.Tongue - is in midline.    Muscle tone - is normal all over. Moves all extremities equally. No asymmetry is seen.      Motor Exam - 5/5 all over, No drift. No shaking or tremors.    Sensory Exam - Pin prick, temperature, joint position and vibration are intact on either side. Pt withdraws all extremities equally on stimulation. No asymmetry seen. No complaints of tingling, numbness.      coordination:    Finger to nose: normal.    Deep tendon Reflexes - 2 plus all over.          Neck Supple -  Yes.     MEDICATIONS    atorvastatin 10 milliGRAM(s) Oral at bedtime  enoxaparin Injectable 80 milliGRAM(s) SubCutaneous two times a day  warfarin 10 milliGRAM(s) Oral once      Allergies    No Known Allergies    Intolerances        LABS:  CBC Full  -  ( 22 Dec 2018 05:52 )  WBC Count : 4.88 K/uL  Hemoglobin : 13.9 g/dL  Hematocrit : 41.6 %  Platelet Count - Automated : 137 K/uL  Mean Cell Volume : 93.9 fl  Mean Cell Hemoglobin : 31.4 pg  Mean Cell Hemoglobin Concentration : 33.4 gm/dL  Auto Neutrophil # : 2.42 K/uL  Auto Lymphocyte # : 1.47 K/uL  Auto Monocyte # : 0.60 K/uL  Auto Eosinophil # : 0.33 K/uL  Auto Basophil # : 0.04 K/uL  Auto Neutrophil % : 49.6 %  Auto Lymphocyte % : 30.1 %  Auto Monocyte % : 12.3 %  Auto Eosinophil % : 6.8 %  Auto Basophil % : 0.8 %    Urinalysis Basic - ( 22 Dec 2018 12:22 )    Color: Yellow / Appearance: Clear / S.020 / pH: x  Gluc: x / Ketone: Moderate  / Bili: Negative / Urobili: Negative   Blood: x / Protein: Negative / Nitrite: Negative   Leuk Esterase: Negative / RBC: >50 /HPF / WBC 0-2   Sq Epi: x / Non Sq Epi: x / Bacteria: x          141  |  107  |  14  ----------------------------<  85  3.8   |  29  |  0.81    Ca    8.0<L>      22 Dec 2018 05:52    TPro  6.3  /  Alb  3.4  /  TBili  1.0  /  DBili  x   /  AST  12<L>  /  ALT  19  /  AlkPhos  65      Hemoglobin A1C: Hemoglobin A1C, Whole Blood: 5.8 % ( @ 09:57)    Lipid Panel  @ 09:56  Total Cholesterol, Serum 151  LDL 94  Triglycerides 71    Vitamin B12     RADIOLOGY  < from: US Duplex Carotid Arteries Complete, Bilateral (18 @ 11:01) >    EXAM:  US DPLX CAROTIDS COMPL BI                            PROCEDURE DATE:  2018          INTERPRETATION:  CLINICAL STATEMENT: Evaluate for carotid artery   stenosis.   CVA/TIA.    TECHNIQUE: Carotid artery ultrasound was performed. [employer]   was utilized.    COMPARISON: None.    FINDINGS: There is plaque in the carotid bulb bilaterally. There is also   lack in the left proximal ICA.    There is no significant narrowing in the visualized common carotid and   internal carotid arteries.    Peak systolic velocity measurements in centimeters per second as   described below.    RIGHT:    CCA: 67  ICA: 74  ICA/CCA ratio: 1.1  There is antegrade flow in the right vertebral artery.    LEFT:    CCA: 82  ICA: 79  ICA/CCA ratio: 1.0  There is antegrade flow in the left vertebral artery.    IMPRESSION:    No evidence of hemodynamically significant stenosis by velocity   measurements.    Measurement of carotid stenosis is based on velocity parameters that   correlate the residual internal carotid diameter with that of the more   distal vessel in accordance with a method such as the [place] American   Symptomatic Carotid Endarterectomy Trial (NASCET).                 DIONTE MCDUFFIE M.D.,ATTENDING RADIOLOGIST  This document has beenelectronically signed. 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  SPEECH FOLLOW UP,  Physical therapy evaluation.  OOB to chair/ambulation with assistance only.  Pain is accessed and addressed.  Plan of care was discussed with family. Questions answered.  Would continue to follow.
Patient is a 73y old  Male who presents with a chief complaint of     HPI:  72 yo M with PMH of HLD, GERD, multiple DVTs, BPH and hiatal hernia (s/p repair) presents with slurred speech. He reports that he was out at breakfast this morning, sitting and eating when he noticed that he had slurred speech and generalized weakness, more in his legs (~8:30 AM). pt had weakness in bilateral legs but denied numbness/tingling. Patient then drove home and came to the ED with his wife. This has never occurred before. Wife at bedside and patient report that the slurred speech is slightly worse now. Denies any FNDs and is not currently feeling weak. Patient worked on a crossword puzzle while waiting in the ED and denies any confusion. Denies any history of CVA, MI or A fib. He reports having a DVT in his left leg ~5 years ago and a second DVT 3 years ago for which he has been on Coumadin and has his blood checked once in awhile for the Coumadin. Denies fever, chills, chest pain, palpitations, SOB, cough, abdominal pain, nausea, vomiting, diarrhea, constipation, urinary frequency, urgency, or dysuria, headaches, changes in vision, dizziness, numbness, tingling or other complaints.   c/o slurred speech, subcentimeter acute/subacute infarct left corona radiata found on MRI, admitted for   CVA.      Patient seen and examined  today  .  alert awake , no resp distress ,  slurred speech little better , no fever, tolerating diet.     INTERVAL HPI/OVERNIGHT EVENTS:  T(C): 36.6 (18 @ 11:29), Max: 37.1 (18 @ 21:53)  HR: 69 (18 @ 11:29) (64 - 69)  BP: 144/92 (18 @ 11:29) (141/87 - 159/92)  RR: 17 (18 @ 11:29) (17 - 20)  SpO2: 97% (18 @ 11:29) (93% - 97%)  Wt(kg): --  I&O's Summary    22 Dec 2018 07:01  -  23 Dec 2018 07:00  --------------------------------------------------------  IN: 0 mL / OUT: 100 mL / NET: -100 mL        REVIEW OF SYSTEMS:  CONSTITUTIONAL: No fever, weight loss, or fatigue  EYES: No eye pain, visual disturbances, or discharge  ENMT:   No sinus or throat pain  NECK: No pain or stiffness  RESPIRATORY: No cough, wheezing, chills or hemoptysis; No shortness of breath  CARDIOVASCULAR: No chest pain, palpitations, dizziness, or leg swelling  GASTROINTESTINAL: No abdominal or epigastric pain. No nausea, vomiting  No diarrhea or constipation.   GENITOURINARY: No dysuria, frequency, hematuria, or incontinence  NEUROLOGICAL: No headaches, memory loss, loss of strength, numbness, or tremors  SKIN: No itching, burning, rashes, or lesions     MUSCULOSKELETAL: No joint pain or swelling; No muscle, back, or extremity pain      PHYSICAL EXAM:  GENERAL: NAD, well-groomed, well-developed  HEAD:  Atraumatic, Normocephalic  EYES: EOMI, PERRLA, conjunctiva and sclera clear  ENMT: ; Moist mucous membranes,  NECK: Supple, No JVD,  NERVOUS SYSTEM:  Alert & Oriented X3, Good concentration; Motor Strength 5/5 B/L upper and lower extremities; DTRs 2+ intact and symmetric  CHEST/LUNG: Clear to percussion bilaterally; No rales, rhonchi, wheezing,   HEART: Regular rate and rhythm; No murmurs,  no tachy   ABDOMEN: Soft, Nontender, Nondistended; Bowel sounds present  EXTREMITIES:  2+ Peripheral Pulses, No clubbing, cyanosis, or edema  SKIN: No rashes or lesions    MEDICATIONS  (STANDING):  atorvastatin 10 milliGRAM(s) Oral at bedtime  enoxaparin Injectable 80 milliGRAM(s) SubCutaneous two times a day    MEDICATIONS  (PRN):      LABS:                        14.5   4.27  )-----------( 140      ( 23 Dec 2018 07:46 )             43.2         140  |  108  |  15  ----------------------------<  93  4.0   |  26  |  0.73    Ca    8.3<L>      23 Dec 2018 07:46    TPro  6.3  /  Alb  3.4  /  TBili  1.0  /  DBili  x   /  AST  12<L>  /  ALT  19  /  AlkPhos  65  12-    PT/INR - ( 23 Dec 2018 07:46 )   PT: 21.8 sec;   INR: 1.89 ratio         PTT - ( 23 Dec 2018 07:46 )  PTT:42.4 sec  Urinalysis Basic - ( 22 Dec 2018 12:22 )    Color: Yellow / Appearance: Clear / S.020 / pH: x  Gluc: x / Ketone: Moderate  / Bili: Negative / Urobili: Negative   Blood: x / Protein: Negative / Nitrite: Negative   Leuk Esterase: Negative / RBC: >50 /HPF / WBC 0-2   Sq Epi: x / Non Sq Epi: x / Bacteria: x      CAPILLARY BLOOD GLUCOSE           @ 15:04   <10,000 CFU/ml  Normal Urogenital armando present  --  --   @ 19:28   <10,000 CFU/ml Normal Urogenital armando present  --  --   @ 15:27   No growth to date.  --  --          RADIOLOGY & ADDITIONAL TESTS:    Imaging Personally Reviewed: echo   Conclusion:   1. Mild concentric left ventricular hypertrophy with normal global left   ventricular systolic function  2. Mitral annular calcification with trace mitral regurgitation  3. Aortic sclerosis  4. Doppler evidence suggestive of diastolic dysfunction   5 no cardiac source of thromboembolism noted. Consider NEHA if clinically   indicated    Advance Directives:  full code

## 2018-12-24 NOTE — DISCHARGE NOTE ADULT - HOSPITAL COURSE
72 yo M with PMH of HLD, GERD, DVT, BPH and hiatal hernia (s/p repair) presented with slurred speech, subcentimeter acute/subacute infarct left corona radiata found on MRI, admitted for   CVA.  hosp course for  Cerebrovascular accident (CVA), unspecified mechanism.  Subcentimeter acute/subacute infarct left corona radiata on MRI head, unremarkable MRA  - Monitor on tele for arrhythmia  no event so far  no afib notice  .   - Neuro following: [facility].  - Continue home meds Atorvastatin and Coumadin   need inr  2 to 3 .  - Echo Conclusion:   1. Mild concentric left ventricular hypertrophy with normal global left   ventricular systolic function  2. Mitral annular calcification with trace mitral regurgitation  3. Aortic sclerosis  4. Doppler evidence suggestive of diastolic dysfunction   5 no cardiac source of thromboembolism noted. Consider NEHA if clinically   indicated  - Carotid dopplers neg  for stenosis   - bedside dysphagia screen performed, no signs of aspiration / seen by speech and swallow on diet   - Aspiration precautions  - PT eval no need rehab   pt does  have  hx of  DVT (deep venous thrombosis). Hx of L LE DVT approx 5 years ago and again 3 years ago  - INR subtherapeutic, so given his current CVA,  Will also give full dose Lovenox until INR therapeutic.   - monitor INR daily.     hx Gastroesophageal reflux disease without esophagitis  S/p hiatal hernia repair approximately 3 years ago  - Not on any meds.  HLD (hyperlipidemia) Atorvastatin. ldl 94.   subacute left mca infarct with dysarthria/facial weakness   possible embolic   resolved   symptom  .   medically stable day of dc clear by neuro dr green   physical exam  12-24-18 day of dc   Vital Signs Last 24 Hrs  T(C): 36.3 (24 Dec 2018 04:39), Max: 37.1 (24 Dec 2018 00:03)  T(F): 97.4 (24 Dec 2018 04:39), Max: 98.7 (24 Dec 2018 00:03)  HR: 66 (24 Dec 2018 04:39) (66 - 76)  BP: 116/72 (24 Dec 2018 04:39) (116/72 - 147/96)  BP(mean): --  RR: 17 (24 Dec 2018 04:39) (17 - 18)  SpO2: 95% (24 Dec 2018 04:39) (94% - 97%)  GEN no distress , HEENT nt/nc , perrla , CVS s1s2 no tachy , CHEST bl air entery  present  , no wheezing  , CNS aao/3 , no focal deficit  motor 5/5 all 4ext  , no sensory deficit  dtr intact  , GI soft , bs present ,  EXT no edema, pedal pulse present , SKIN no rash.  fu with pmd  dr guerrero  inr Wednesday keep inr 2 to 3. out pt neuro with  in 1wk .

## 2018-12-26 LAB
CULTURE RESULTS: SIGNIFICANT CHANGE UP
CULTURE RESULTS: SIGNIFICANT CHANGE UP
SPECIMEN SOURCE: SIGNIFICANT CHANGE UP
SPECIMEN SOURCE: SIGNIFICANT CHANGE UP

## 2019-10-26 ENCOUNTER — EMERGENCY (EMERGENCY)
Facility: HOSPITAL | Age: 74
LOS: 1 days | Discharge: HOME | End: 2019-10-26
Attending: EMERGENCY MEDICINE | Admitting: EMERGENCY MEDICINE
Payer: COMMERCIAL

## 2019-10-26 VITALS
DIASTOLIC BLOOD PRESSURE: 75 MMHG | OXYGEN SATURATION: 99 % | RESPIRATION RATE: 18 BRPM | HEART RATE: 70 BPM | TEMPERATURE: 98 F | WEIGHT: 184.97 LBS | SYSTOLIC BLOOD PRESSURE: 121 MMHG

## 2019-10-26 VITALS
RESPIRATION RATE: 17 BRPM | OXYGEN SATURATION: 99 % | HEART RATE: 79 BPM | TEMPERATURE: 98 F | SYSTOLIC BLOOD PRESSURE: 150 MMHG | DIASTOLIC BLOOD PRESSURE: 91 MMHG

## 2019-10-26 DIAGNOSIS — Z87.19 PERSONAL HISTORY OF OTHER DISEASES OF THE DIGESTIVE SYSTEM: Chronic | ICD-10-CM

## 2019-10-26 DIAGNOSIS — Z98.890 OTHER SPECIFIED POSTPROCEDURAL STATES: Chronic | ICD-10-CM

## 2019-10-26 DIAGNOSIS — Z41.9 ENCOUNTER FOR PROCEDURE FOR PURPOSES OTHER THAN REMEDYING HEALTH STATE, UNSPECIFIED: Chronic | ICD-10-CM

## 2019-10-26 LAB
ANION GAP SERPL CALC-SCNC: 4 MMOL/L — LOW (ref 5–17)
APTT BLD: 35.4 SEC — SIGNIFICANT CHANGE UP (ref 28.5–37)
BASOPHILS # BLD AUTO: 0.04 K/UL — SIGNIFICANT CHANGE UP (ref 0–0.2)
BASOPHILS NFR BLD AUTO: 0.6 % — SIGNIFICANT CHANGE UP (ref 0–2)
BUN SERPL-MCNC: 14 MG/DL — SIGNIFICANT CHANGE UP (ref 7–23)
CALCIUM SERPL-MCNC: 8.1 MG/DL — LOW (ref 8.5–10.1)
CHLORIDE SERPL-SCNC: 109 MMOL/L — HIGH (ref 96–108)
CO2 SERPL-SCNC: 29 MMOL/L — SIGNIFICANT CHANGE UP (ref 22–31)
CREAT SERPL-MCNC: 0.82 MG/DL — SIGNIFICANT CHANGE UP (ref 0.5–1.3)
EOSINOPHIL # BLD AUTO: 0.14 K/UL — SIGNIFICANT CHANGE UP (ref 0–0.5)
EOSINOPHIL NFR BLD AUTO: 2 % — SIGNIFICANT CHANGE UP (ref 0–6)
GLUCOSE SERPL-MCNC: 105 MG/DL — HIGH (ref 70–99)
HCT VFR BLD CALC: 40.5 % — SIGNIFICANT CHANGE UP (ref 39–50)
HGB BLD-MCNC: 13.3 G/DL — SIGNIFICANT CHANGE UP (ref 13–17)
IMM GRANULOCYTES NFR BLD AUTO: 0.3 % — SIGNIFICANT CHANGE UP (ref 0–1.5)
INR BLD: 1.79 RATIO — HIGH (ref 0.88–1.16)
LYMPHOCYTES # BLD AUTO: 1.36 K/UL — SIGNIFICANT CHANGE UP (ref 1–3.3)
LYMPHOCYTES # BLD AUTO: 19.5 % — SIGNIFICANT CHANGE UP (ref 13–44)
MCHC RBC-ENTMCNC: 31.9 PG — SIGNIFICANT CHANGE UP (ref 27–34)
MCHC RBC-ENTMCNC: 32.8 GM/DL — SIGNIFICANT CHANGE UP (ref 32–36)
MCV RBC AUTO: 97.1 FL — SIGNIFICANT CHANGE UP (ref 80–100)
MONOCYTES # BLD AUTO: 0.69 K/UL — SIGNIFICANT CHANGE UP (ref 0–0.9)
MONOCYTES NFR BLD AUTO: 9.9 % — SIGNIFICANT CHANGE UP (ref 2–14)
NEUTROPHILS # BLD AUTO: 4.72 K/UL — SIGNIFICANT CHANGE UP (ref 1.8–7.4)
NEUTROPHILS NFR BLD AUTO: 67.7 % — SIGNIFICANT CHANGE UP (ref 43–77)
NRBC # BLD: 0 /100 WBCS — SIGNIFICANT CHANGE UP (ref 0–0)
PLATELET # BLD AUTO: 148 K/UL — LOW (ref 150–400)
POTASSIUM SERPL-MCNC: 4.2 MMOL/L — SIGNIFICANT CHANGE UP (ref 3.5–5.3)
POTASSIUM SERPL-SCNC: 4.2 MMOL/L — SIGNIFICANT CHANGE UP (ref 3.5–5.3)
PROTHROM AB SERPL-ACNC: 20.8 SEC — HIGH (ref 10–12.9)
RBC # BLD: 4.17 M/UL — LOW (ref 4.2–5.8)
RBC # FLD: 12.9 % — SIGNIFICANT CHANGE UP (ref 10.3–14.5)
SODIUM SERPL-SCNC: 142 MMOL/L — SIGNIFICANT CHANGE UP (ref 135–145)
WBC # BLD: 6.97 K/UL — SIGNIFICANT CHANGE UP (ref 3.8–10.5)
WBC # FLD AUTO: 6.97 K/UL — SIGNIFICANT CHANGE UP (ref 3.8–10.5)

## 2019-10-26 PROCEDURE — 80048 BASIC METABOLIC PNL TOTAL CA: CPT

## 2019-10-26 PROCEDURE — 70450 CT HEAD/BRAIN W/O DYE: CPT

## 2019-10-26 PROCEDURE — 99284 EMERGENCY DEPT VISIT MOD MDM: CPT | Mod: 25

## 2019-10-26 PROCEDURE — 85027 COMPLETE CBC AUTOMATED: CPT

## 2019-10-26 PROCEDURE — 73030 X-RAY EXAM OF SHOULDER: CPT

## 2019-10-26 PROCEDURE — 73030 X-RAY EXAM OF SHOULDER: CPT | Mod: 26,LT

## 2019-10-26 PROCEDURE — 85730 THROMBOPLASTIN TIME PARTIAL: CPT

## 2019-10-26 PROCEDURE — 72125 CT NECK SPINE W/O DYE: CPT

## 2019-10-26 PROCEDURE — 99284 EMERGENCY DEPT VISIT MOD MDM: CPT

## 2019-10-26 PROCEDURE — 70450 CT HEAD/BRAIN W/O DYE: CPT | Mod: 26

## 2019-10-26 PROCEDURE — 96372 THER/PROPH/DIAG INJ SC/IM: CPT

## 2019-10-26 PROCEDURE — 85610 PROTHROMBIN TIME: CPT

## 2019-10-26 PROCEDURE — 36415 COLL VENOUS BLD VENIPUNCTURE: CPT

## 2019-10-26 PROCEDURE — 72125 CT NECK SPINE W/O DYE: CPT | Mod: 26

## 2019-10-26 RX ORDER — ENOXAPARIN SODIUM 100 MG/ML
80 INJECTION SUBCUTANEOUS ONCE
Refills: 0 | Status: COMPLETED | OUTPATIENT
Start: 2019-10-26 | End: 2019-10-26

## 2019-10-26 RX ADMIN — ENOXAPARIN SODIUM 80 MILLIGRAM(S): 100 INJECTION SUBCUTANEOUS at 18:41

## 2019-10-26 NOTE — ED PROVIDER NOTE - PHYSICAL EXAMINATION
GEN: awake, alert, well appearing, NAD   HENT: atraumatic, normocephalic, MAI, EOMI, no midline instability, oropharynx w/o erythema or exudates, no lymphadenopathy  CV: normal rate and rhythm, S1, S2, no MRG, equal pulses throughout, no JVD  RESP: no distress, no IWOB, no retraction, clear to auscultation bilaterally   ABD: soft, nontender, nondistended, no rebound, no guarding, normoactive bowel sounds, no organomegally  MUSCULOSKELETAL: strenght 5/5 x 4, full range of motion, CMS intact, TTP to L shoulder   SKIN: normal color, no turgor, no wounds or rash   NEURO: Awake alert oriented x 3, no facial asymmetry, no slurred speech, no pronator drift, moving all extremities  PSYCH: no suicial ideation, no homicidal ideation, no depression, no anxiety, no hallucination

## 2019-10-26 NOTE — ED ADULT NURSE NOTE - OBJECTIVE STATEMENT
patient states PTA while on a boat he tripped fell and injured left shoulder pt has limited ROM and pain to same swelling noted pt went to [facility] and was sent here for x-ray

## 2019-10-26 NOTE — ED PROVIDER NOTE - CARE PLAN
Assessment and plan of treatment:	75yo M h/o dvt on coumadin p/w L shoulder pain s/p fall. pt reports he was fishing and fell onto his shoulder. denies head trauma. denies dizziness, cp, sob. Principal Discharge DX:	Shoulder injury, left, initial encounter  Assessment and plan of treatment:	75yo M h/o dvt on coumadin p/w L shoulder pain s/p fall. pt reports he was fishing and fell onto his shoulder. denies head trauma. denies dizziness, cp, sob.  Secondary Diagnosis:	Subtherapeutic anticoagulation

## 2019-10-26 NOTE — ED PROVIDER NOTE - PATIENT PORTAL LINK FT
You can access the FollowMyHealth Patient Portal offered by [facility] by registering at the following website: [email or web address]. By joining [employer]’s FollowMyHealth portal, you will also be able to view your health information using other applications (apps) compatible with our system.

## 2019-10-26 NOTE — ED PROVIDER NOTE - CLINICAL SUMMARY MEDICAL DECISION MAKING FREE TEXT BOX
75yo M h/o dvt on coumadin p/w L shoulder pain s/p fall. pt reports he was fishing and fell onto his shoulder. denies head trauma. denies dizziness, cp, sob. 75yo M h/o dvt on coumadin p/w L shoulder pain s/p fall. pt reports he was fishing and fell onto his shoulder. denies head trauma. denies dizziness, cp, sob.  CT XR unremarkable, INR subtherapeutic, given sling, Lovenox. pt to f/u w/ pcp/ortho for further eval and mgmt

## 2019-10-26 NOTE — ED PROVIDER NOTE - PLAN OF CARE
73yo M h/o dvt on coumadin p/w L shoulder pain s/p fall. pt reports he was fishing and fell onto his shoulder. denies head trauma. denies dizziness, cp, sob.

## 2019-10-26 NOTE — ED ADULT NURSE NOTE - NS ED NURSE DC INFO COMPLEXITY
Complex: Multiple Rx/Tx. Pt has difficulty understanding. Requires additional help/Straightforward: Basic instructions, no meds, no home treatment/Patient asked questions/Returned Demonstration/Simple: Patient demonstrates quick and easy understanding/Verbalized Understanding

## 2019-10-26 NOTE — ED PROVIDER NOTE - NS ED ROS FT
GEN: no fever, no chills, no weakness  HENT: no eye pain, no visual changes, no ear pain, no visual or hearing changes, no sore throat, no swelling or neck pain  CV: no chest pain, no palpitations, no dizziness, no swelling  RESP: no coughing, no sob, no IWOB, no HARDY  GI: no abd pain, no distension, no nausea, no vomiting, no diarrhea, no constipation  : no dysuria,  no frequency, no hematuria, no discharge, no flank pain  MUSCULOSKELETAL: no myalgia, +arthralgia, no joint swelling, no bruising   SKIN: no rash, no wounds, no itching  NEURO: no change in mentation, no visual changes, no HA, no focal weakness, no trouble speaking, no gait abnormalities, no dizziness  PSYCH: no suidical ideation, no homicidal ideation, no depression, no anxiety, no hallucinations

## 2019-10-26 NOTE — ED PROVIDER NOTE - OBJECTIVE STATEMENT
75yo M h/o dvt on coumadin p/w L shoulder pain s/p fall. pt reports he was fishing and fell onto his shoulder. denies head trauma. denies dizziness, cp, sob.

## 2019-12-26 NOTE — ED ADULT NURSE NOTE - NSIMPLEMENTINTERV_GEN_ALL_ED
Implemented All Fall Risk Interventions:  [place] to call system. Call bell, personal items and telephone within reach. Instruct patient to call for assistance. Room bathroom lighting operational. Non-slip footwear when patient is off stretcher. Physically safe environment: no spills, clutter or unnecessary equipment. Stretcher in lowest position, wheels locked, appropriate side rails in place. Provide visual cue, wrist band, yellow gown, etc. Monitor gait and stability. Monitor for mental status changes and reorient to person, place, and time. Review medications for side effects contributing to fall risk. Reinforce activity limits and safety measures with patient and family. Rendering Text In Billing: The biopsy specimens were grossed and processed into slides.

## 2020-02-18 NOTE — PATIENT PROFILE ADULT - BRADEN NUTRITION
The patient has chronic claudication symptoms.  He is mostly bothered by left hip pain.  There are no signs of critical limb ischemia.  The patient has CKD and is at high risk for contrast-induced nephropathy.  We discussed that the procedure may be done with carbon dioxide with little iodine based contrast, but still would be at risk for contrast-induced nephropathy even at small amounts of contrast.  We discussed that procedures should be reserved for severe claudication and/or critical limb ischemia.  The patient clearly reports that his symptoms have been stable for many years.  We will continue with medical management.   (3) adequate

## 2020-06-29 NOTE — PATIENT PROFILE ADULT - NSPROIMPLANTSMEDDEV_GEN_A_NUR
Requested Prescriptions     Signed Prescriptions Disp Refills   • gabapentin (NEURONTIN) 600 MG tablet 540 Tab 2     Sig: TAKE 2 TABLETS BY MOUTH 3 TIMES A DAY     Authorizing Provider: EFRAIN MILLER A.P.R.N.     None

## 2020-09-24 ENCOUNTER — RESULT REVIEW (OUTPATIENT)
Age: 75
End: 2020-09-24

## 2021-06-07 NOTE — DISCHARGE NOTE ADULT - VISION (WITH CORRECTIVE LENSES IF THE PATIENT USUALLY WEARS THEM):
Partially impaired: cannot see medication labels or newsprint, but can see obstacles in path, and the surrounding layout; can count fingers at arm's length
Admission

## 2022-06-16 ENCOUNTER — EMERGENCY (EMERGENCY)
Facility: HOSPITAL | Age: 77
LOS: 1 days | Discharge: HOME | End: 2022-06-16
Attending: EMERGENCY MEDICINE | Admitting: EMERGENCY MEDICINE
Payer: COMMERCIAL

## 2022-06-16 VITALS
SYSTOLIC BLOOD PRESSURE: 126 MMHG | TEMPERATURE: 97 F | HEART RATE: 71 BPM | DIASTOLIC BLOOD PRESSURE: 84 MMHG | WEIGHT: 164.91 LBS | HEIGHT: 71 IN | OXYGEN SATURATION: 97 % | RESPIRATION RATE: 15 BRPM

## 2022-06-16 DIAGNOSIS — Z98.890 OTHER SPECIFIED POSTPROCEDURAL STATES: Chronic | ICD-10-CM

## 2022-06-16 DIAGNOSIS — Z87.19 PERSONAL HISTORY OF OTHER DISEASES OF THE DIGESTIVE SYSTEM: Chronic | ICD-10-CM

## 2022-06-16 DIAGNOSIS — Z41.9 ENCOUNTER FOR PROCEDURE FOR PURPOSES OTHER THAN REMEDYING HEALTH STATE, UNSPECIFIED: Chronic | ICD-10-CM

## 2022-06-16 DIAGNOSIS — K56.2 VOLVULUS: Chronic | ICD-10-CM

## 2022-06-16 PROCEDURE — 30901 CONTROL OF NOSEBLEED: CPT | Mod: RT

## 2022-06-16 PROCEDURE — 30903 CONTROL OF NOSEBLEED: CPT | Mod: RT

## 2022-06-16 PROCEDURE — 99283 EMERGENCY DEPT VISIT LOW MDM: CPT | Mod: 25

## 2022-06-16 RX ORDER — WARFARIN SODIUM 2.5 MG/1
1 TABLET ORAL
Qty: 0 | Refills: 0 | DISCHARGE

## 2022-06-16 NOTE — ED ADULT NURSE NOTE - NSICDXPASTMEDICALHX_GEN_ALL_CORE_FT
PAST MEDICAL HISTORY:  BPH (benign prostatic hyperplasia)     DVT (deep venous thrombosis) left popliteal  > 5 yrs    Gastroesophageal reflux disease without esophagitis     Hiatal hernia     HLD (hyperlipidemia)      PAST MEDICAL HISTORY:  BPH (benign prostatic hyperplasia)     DVT (deep venous thrombosis) left popliteal  > 5 yrs    Gastroesophageal reflux disease without esophagitis     Hiatal hernia     HLD (hyperlipidemia)     Stroke

## 2022-06-16 NOTE — ED ADULT NURSE NOTE - OBJECTIVE STATEMENT
Patient alert and oriented X 3. Complaining of right sided nosebleed after dinner at 1800. Patient on coumadin for history of stroke and DVT. Denies chest pain, shortness of breath, nausea, vomiting, headache, dizziness and fever. Lungs clears bilaterally. Respirations even and not labored. Abdomen soft non tender.

## 2022-06-16 NOTE — ED ADULT NURSE NOTE - NSICDXPASTSURGICALHX_GEN_ALL_CORE_FT
PAST SURGICAL HISTORY:  Elective surgery diaphragm repair   2016  -    H/O hiatal hernia     H/O shoulder surgery      PAST SURGICAL HISTORY:  Elective surgery diaphragm repair   2016  -    H/O hiatal hernia     H/O shoulder surgery     Volvulus

## 2022-06-17 NOTE — ED PROVIDER NOTE - NSICDXPASTMEDICALHX_GEN_ALL_CORE_FT
PAST MEDICAL HISTORY:  BPH (benign prostatic hyperplasia)     DVT (deep venous thrombosis) left popliteal  > 5 yrs    Gastroesophageal reflux disease without esophagitis     Hiatal hernia     HLD (hyperlipidemia)     Stroke

## 2022-06-17 NOTE — ED PROVIDER NOTE - CHPI ED SYMPTOMS NEG
no blurred vision/no fever/no nausea/no numbness/no syncope/no vomiting/no change in level of consciousness/no chills

## 2022-06-17 NOTE — ED PROVIDER NOTE - PATIENT PORTAL LINK FT
You can access the FollowMyHealth Patient Portal offered by [facility] by registering at the following website: [email or web address]. By joining [employer]’s FollowMyHealth portal, you will also be able to view your health information using other applications (apps) compatible with our system.

## 2022-06-17 NOTE — ED PROVIDER NOTE - NSICDXPASTSURGICALHX_GEN_ALL_CORE_FT
Order placed PAST SURGICAL HISTORY:  Elective surgery diaphragm repair   2016  -    H/O hiatal hernia     H/O shoulder surgery     Volvulus

## 2022-06-17 NOTE — ED PROVIDER NOTE - PROGRESS NOTE DETAILS
Pt doing well, no acute co - no further bleeding sp packing, no post pharyngeal bleeding well. Dw pt and daughter re epistaxis prec/ inst, and importance of close, prompt fu with ENT and to return with any changes or concerns.

## 2022-06-17 NOTE — ED PROVIDER NOTE - CARE PROVIDER_API CALL
Dallin Carrasco)  [facility]   [address]  [place] [address]  Phone: [phone]  Fax: [phone]  Follow Up Time:     Taylor Weaver  INTERNAL MEDICINE  [address] [place] [id number]  Phone: [phone]  Fax: [phone]  Follow Up Time:

## 2022-06-17 NOTE — ED PROVIDER NOTE - OBJECTIVE STATEMENT
78 yo M p/w epistaxis today - on / off in R nare which started few hours pta. Pt on coumadin, last INR 2 weeks ago with INR 2.5, no recent changes in dosing / diet / meds. NO fever/chills. no weakness /dizziness. no n/v/dizzy. no cp/sob/palp. no cough/ uri. no agg/allev factors. No other acute co or changes. NO covid exposures. no other acute co.

## 2022-06-17 NOTE — ED PROVIDER NOTE - NSFOLLOWUPINSTRUCTIONS_ED_ALL_ED_FT
1) Follow-up with your Primary Medical Doctor or referred doctor. Call today / next business day for prompt follow-up.  2) Return to Emergency room for any worsening or persistent pain, weakness, fever, worsening or persistent bleeding, discharge from nose, or any other concerning symptoms.  3) See attached instruction sheets for additional information, including information regarding signs and symptoms to look out for, reasons to seek immediate care and other important instructions.  4) Follow-up with ENT - call in morning for prompt follow-up  5) Duricef twice daily while packing in place

## 2022-06-17 NOTE — ED PROVIDER NOTE - ENMT, MLM
Airway patent, Nasal mucosa clear. Mouth with normal mucosa. Throat has no vesicles, no oropharyngeal exudates and uvula is midline. R nare with oozing blood, no edema. nl L nare. no other bleeding. no post pharyngeal bleeding.

## 2023-03-14 NOTE — ED ADULT NURSE NOTE - NS ED NOTE ABUSE SUSPICION NEGLECT YN
Speech/Language Treatment Note    Today's date: 3/14/2023  Patient name: Lizandro Flores  : 2013  MRN: [id number]  Referring provider: Trevon Silverman*  Dx:   Encounter Diagnosis     ICD-10-CM    1  Other symbolic dysfunctions  P89 2       2  Autism spectrum disorder  F84 0           Start Time: 186  Stop Time: 0830  Total time in clinic (min): 40 minutes    Visit Number:   Re-assessment: 3/21/2023    Subjective/Behavioral: Pt arrived with mom  Seen in small therapy room with ST  Pt participated in 3/3 activities well  Short Term Goals:  1  Pavel Felton will independently follow 2-3 step directives with age-appropriate modifiers (e g , location, shape, quantity, color, etc ) in 4/5 opps  With min verbal cues to initially repeat directives, pt followed 2-step directives to put colored cars in puzzle in 3/5 opps  2  Pavel Felton will independently describe actions in play, comment, and request using pronouns in age-appropriate 3-4 word utterances with 80% accuracy  Pt produced spontaneous utterances using "I" and "we" pronouns correctly (e g , Can I find Miss Aguayo Settler?, I want to play that, We're in the big room, etc )    3  Pavel Felton will appropriately answer [address] questions (e g , what, who, when, where) with 80% accuracy  Pt gestured towards or selected objects targeted in WHERE questions in play  Pt accurately answered WHERE questions in 1/5 opps during "What Goes Together" puzzle  4  Pavel Felton will appropriately answer yes/no questions with 80% accuracy  Pt consistently answered "yes" to all questions when prompted with verbal choice for yes or no  Following direct prompts, he corrected errors to respond "no"  Long Term Goals:  1  Pavel Felton will improve receptive language skills to be [facility]  2  Pavel Felton will improve expressive language skills to be [facility]       Caregiver goal: reduce repeating of other's words, speak in full sentences to express wants and needs    Other:Discussed session/carryover with female caregiver/family member today    Recommendations:Continue with Plan of Care No

## 2023-05-11 NOTE — ED PROVIDER NOTE - CPE EDP HEME LYMPH NORM
May 11, 2023     Patient: Sunshine Lazar   YOB: 2013   Date of Visit: 5/11/2023       To Whom it May Concern:    Sunshine Lazar was seen in my clinic on 5/11/2023 at 1:00 pm.     Please excuse Sunshine for her absence from school on 5/10/23-5/12/23. Sunshine may return to school Monday 5/15/23.     Sincerely,         Kayleen Villegas MD    Medical information is confidential and cannot be disclosed without the written consent of the patient or her representative.    
normal...

## 2024-01-18 ENCOUNTER — OFFICE (OUTPATIENT)
Dept: URBAN - METROPOLITAN AREA CLINIC 70 | Facility: CLINIC | Age: 79
Setting detail: OPHTHALMOLOGY
End: 2024-01-18
Payer: COMMERCIAL

## 2024-01-18 DIAGNOSIS — H25.13: ICD-10-CM

## 2024-01-18 DIAGNOSIS — H25.11: ICD-10-CM

## 2024-01-18 DIAGNOSIS — H35.3131: ICD-10-CM

## 2024-01-18 PROCEDURE — 92136 OPHTHALMIC BIOMETRY: CPT | Mod: RT | Performed by: OPHTHALMOLOGY

## 2024-01-18 PROCEDURE — 92134 CPTRZ OPH DX IMG PST SGM RTA: CPT | Performed by: OPHTHALMOLOGY

## 2024-01-18 PROCEDURE — 92004 COMPRE OPH EXAM NEW PT 1/>: CPT | Performed by: OPHTHALMOLOGY

## 2024-01-18 ASSESSMENT — REFRACTION_MANIFEST
OS_ADD: +3.00
OU_VA: 20/40
OS_SPHERE: -0.75
OD_CYLINDER: -0.50
OS_CYLINDER: -0.75
OS_VA1: 20/40
OD_ADD: +3.00
OD_VA1: 20/40-2
OD_SPHERE: -0.25
OD_VA2: 20/60
OD_AXIS: 092
OS_AXIS: 109
OS_VA2: 20/60

## 2024-01-18 ASSESSMENT — REFRACTION_AUTOREFRACTION
OS_CYLINDER: -1.25
OD_SPHERE: -0.75
OD_CYLINDER: -0.75
OD_AXIS: 092
OS_AXIS: 109
OS_SPHERE: -0.75

## 2024-01-18 ASSESSMENT — CONFRONTATIONAL VISUAL FIELD TEST (CVF)
OD_FINDINGS: FULL
OS_FINDINGS: FULL

## 2024-01-18 ASSESSMENT — SPHEQUIV_DERIVED
OS_SPHEQUIV: -1.375
OD_SPHEQUIV: -1.125
OS_SPHEQUIV: -1.125
OD_SPHEQUIV: -0.5

## 2024-01-26 ENCOUNTER — EMERGENCY (EMERGENCY)
Facility: HOSPITAL | Age: 79
LOS: 1 days | Discharge: LEFT BEFORE DISCHARGE | End: 2024-01-26
Attending: STUDENT IN AN ORGANIZED HEALTH CARE EDUCATION/TRAINING PROGRAM | Admitting: STUDENT IN AN ORGANIZED HEALTH CARE EDUCATION/TRAINING PROGRAM
Payer: COMMERCIAL

## 2024-01-26 VITALS
OXYGEN SATURATION: 96 % | RESPIRATION RATE: 16 BRPM | SYSTOLIC BLOOD PRESSURE: 117 MMHG | DIASTOLIC BLOOD PRESSURE: 80 MMHG | HEART RATE: 61 BPM | HEIGHT: 71 IN | TEMPERATURE: 97 F | WEIGHT: 169.98 LBS

## 2024-01-26 DIAGNOSIS — Z98.890 OTHER SPECIFIED POSTPROCEDURAL STATES: Chronic | ICD-10-CM

## 2024-01-26 DIAGNOSIS — K56.2 VOLVULUS: Chronic | ICD-10-CM

## 2024-01-26 DIAGNOSIS — Z87.19 PERSONAL HISTORY OF OTHER DISEASES OF THE DIGESTIVE SYSTEM: Chronic | ICD-10-CM

## 2024-01-26 DIAGNOSIS — Z41.9 ENCOUNTER FOR PROCEDURE FOR PURPOSES OTHER THAN REMEDYING HEALTH STATE, UNSPECIFIED: Chronic | ICD-10-CM

## 2024-01-26 PROBLEM — I63.9 CEREBRAL INFARCTION, UNSPECIFIED: Chronic | Status: ACTIVE | Noted: 2022-06-16

## 2024-01-26 PROCEDURE — 93010 ELECTROCARDIOGRAM REPORT: CPT

## 2024-01-26 PROCEDURE — L9991: CPT

## 2024-01-26 PROCEDURE — 93005 ELECTROCARDIOGRAM TRACING: CPT

## 2024-01-26 NOTE — ED ADULT NURSE NOTE - OBJECTIVE STATEMENT
dizziness since 2pm with sweating- lasting for 15 minutes- patient denies being dizzy now- on coumadin- fingerstick- 87

## 2024-01-26 NOTE — ED ADULT NURSE NOTE - NS ED NOTE  TALK SOMEONE YN
From: Edgar Dill  To: Bebeto Basurto MD  Sent: 2/1/2022 9:45 AM CST  Subject: Question regarding CT Scan Head or Neck    What does this all mean? Bone spur? Degeneration spine?      Thank you No

## 2024-01-26 NOTE — ED ADULT NURSE REASSESSMENT NOTE - NS ED NURSE REASSESS COMMENT FT1
2035: Pt requesting to leave. IV luann taken out. MD made aware. Pt eloped unit with family before MD could reeval pt in room. CRN aware.

## 2024-02-08 ENCOUNTER — RX ONLY (RX ONLY)
Age: 79
End: 2024-02-08

## 2024-02-09 ENCOUNTER — ASC (OUTPATIENT)
Dept: URBAN - METROPOLITAN AREA SURGERY 8 | Facility: SURGERY | Age: 79
Setting detail: OPHTHALMOLOGY
End: 2024-02-09
Payer: COMMERCIAL

## 2024-02-09 DIAGNOSIS — H25.11: ICD-10-CM

## 2024-02-09 PROCEDURE — 66984 XCAPSL CTRC RMVL W/O ECP: CPT | Mod: RT | Performed by: OPHTHALMOLOGY

## 2024-02-10 ENCOUNTER — OFFICE (OUTPATIENT)
Dept: URBAN - METROPOLITAN AREA CLINIC 104 | Facility: CLINIC | Age: 79
Setting detail: OPHTHALMOLOGY
End: 2024-02-10
Payer: COMMERCIAL

## 2024-02-10 DIAGNOSIS — Z96.1: ICD-10-CM

## 2024-02-10 PROCEDURE — 99024 POSTOP FOLLOW-UP VISIT: CPT | Performed by: OPTOMETRIST

## 2024-02-10 ASSESSMENT — REFRACTION_AUTOREFRACTION
OD_SPHERE: +0.25
OS_AXIS: 121
OD_AXIS: 166
OS_CYLINDER: -1.25
OD_CYLINDER: -1.00
OS_SPHERE: -1.50

## 2024-02-10 ASSESSMENT — SPHEQUIV_DERIVED
OS_SPHEQUIV: -2.125
OD_SPHEQUIV: -0.25

## 2024-02-10 ASSESSMENT — CONFRONTATIONAL VISUAL FIELD TEST (CVF)
OD_FINDINGS: FULL
OS_FINDINGS: FULL

## 2024-02-10 ASSESSMENT — CORNEAL EDEMA CLINICAL DESCRIPTION: OD_CORNEALEDEMA: 1+

## 2024-02-12 ENCOUNTER — OFFICE (OUTPATIENT)
Dept: URBAN - METROPOLITAN AREA CLINIC 104 | Facility: CLINIC | Age: 79
Setting detail: OPHTHALMOLOGY
End: 2024-02-12
Payer: COMMERCIAL

## 2024-02-12 DIAGNOSIS — Z96.1: ICD-10-CM

## 2024-02-12 PROBLEM — H25.12 CATARACT SENILE NUCLEAR SCLEROSIS;  , LEFT EYE: Status: ACTIVE | Noted: 2024-02-12

## 2024-02-12 PROCEDURE — 99024 POSTOP FOLLOW-UP VISIT: CPT | Performed by: OPHTHALMOLOGY

## 2024-02-12 ASSESSMENT — CORNEAL EDEMA - FOLDS/STRIAE: OD_FOLDSSTRIAE: T

## 2024-02-12 ASSESSMENT — REFRACTION_AUTOREFRACTION
OS_CYLINDER: -1.75
OS_SPHERE: -1.50
OS_AXIS: 131
OD_AXIS: 149
OD_SPHERE: +0.50
OD_CYLINDER: -0.25

## 2024-02-12 ASSESSMENT — SPHEQUIV_DERIVED
OD_SPHEQUIV: 0.375
OS_SPHEQUIV: -2.375

## 2024-02-12 ASSESSMENT — CONFRONTATIONAL VISUAL FIELD TEST (CVF)
OD_FINDINGS: FULL
OS_FINDINGS: FULL

## 2024-03-08 ENCOUNTER — RX ONLY (RX ONLY)
Age: 79
End: 2024-03-08

## 2024-03-08 ENCOUNTER — ASC (OUTPATIENT)
Dept: URBAN - METROPOLITAN AREA SURGERY 8 | Facility: SURGERY | Age: 79
Setting detail: OPHTHALMOLOGY
End: 2024-03-08
Payer: MEDICARE

## 2024-03-08 DIAGNOSIS — H25.12: ICD-10-CM

## 2024-03-08 PROCEDURE — 66984 XCAPSL CTRC RMVL W/O ECP: CPT | Mod: 79,LT | Performed by: OPHTHALMOLOGY

## 2024-03-09 ENCOUNTER — OFFICE (OUTPATIENT)
Dept: URBAN - METROPOLITAN AREA CLINIC 104 | Facility: CLINIC | Age: 79
Setting detail: OPHTHALMOLOGY
End: 2024-03-09
Payer: MEDICARE

## 2024-03-09 DIAGNOSIS — Z96.1: ICD-10-CM

## 2024-03-09 PROCEDURE — 99024 POSTOP FOLLOW-UP VISIT: CPT | Performed by: OPTOMETRIST

## 2024-03-14 ENCOUNTER — OFFICE (OUTPATIENT)
Dept: URBAN - METROPOLITAN AREA CLINIC 104 | Facility: CLINIC | Age: 79
Setting detail: OPHTHALMOLOGY
End: 2024-03-14
Payer: MEDICARE

## 2024-03-14 DIAGNOSIS — Z96.1: ICD-10-CM

## 2024-03-14 PROCEDURE — 99024 POSTOP FOLLOW-UP VISIT: CPT | Performed by: OPTOMETRIST

## 2024-04-11 ENCOUNTER — OFFICE (OUTPATIENT)
Dept: URBAN - METROPOLITAN AREA CLINIC 104 | Facility: CLINIC | Age: 79
Setting detail: OPHTHALMOLOGY
End: 2024-04-11
Payer: MEDICARE

## 2024-04-11 DIAGNOSIS — Z96.1: ICD-10-CM

## 2024-04-11 PROCEDURE — 99024 POSTOP FOLLOW-UP VISIT: CPT | Performed by: OPTOMETRIST

## 2024-05-15 ENCOUNTER — EMERGENCY (EMERGENCY)
Facility: HOSPITAL | Age: 79
LOS: 1 days | Discharge: HOME | End: 2024-05-15
Attending: EMERGENCY MEDICINE | Admitting: STUDENT IN AN ORGANIZED HEALTH CARE EDUCATION/TRAINING PROGRAM
Payer: COMMERCIAL

## 2024-05-15 VITALS
HEART RATE: 65 BPM | TEMPERATURE: 98 F | SYSTOLIC BLOOD PRESSURE: 74 MMHG | HEIGHT: 71 IN | DIASTOLIC BLOOD PRESSURE: 51 MMHG | WEIGHT: 172.4 LBS | OXYGEN SATURATION: 97 % | RESPIRATION RATE: 18 BRPM

## 2024-05-15 VITALS
HEART RATE: 56 BPM | TEMPERATURE: 98 F | DIASTOLIC BLOOD PRESSURE: 69 MMHG | RESPIRATION RATE: 18 BRPM | SYSTOLIC BLOOD PRESSURE: 109 MMHG | OXYGEN SATURATION: 98 %

## 2024-05-15 DIAGNOSIS — Z87.19 PERSONAL HISTORY OF OTHER DISEASES OF THE DIGESTIVE SYSTEM: Chronic | ICD-10-CM

## 2024-05-15 DIAGNOSIS — Z98.890 OTHER SPECIFIED POSTPROCEDURAL STATES: Chronic | ICD-10-CM

## 2024-05-15 DIAGNOSIS — K56.2 VOLVULUS: Chronic | ICD-10-CM

## 2024-05-15 DIAGNOSIS — Z41.9 ENCOUNTER FOR PROCEDURE FOR PURPOSES OTHER THAN REMEDYING HEALTH STATE, UNSPECIFIED: Chronic | ICD-10-CM

## 2024-05-15 LAB
ALBUMIN SERPL ELPH-MCNC: 3.4 G/DL — SIGNIFICANT CHANGE UP (ref 3.3–5)
ALP SERPL-CCNC: 62 U/L — SIGNIFICANT CHANGE UP (ref 40–120)
ALT FLD-CCNC: 22 U/L — SIGNIFICANT CHANGE UP (ref 12–78)
ANION GAP SERPL CALC-SCNC: 2 MMOL/L — LOW (ref 5–17)
APTT BLD: 30.2 SEC — SIGNIFICANT CHANGE UP (ref 24.5–35.6)
AST SERPL-CCNC: 18 U/L — SIGNIFICANT CHANGE UP (ref 15–37)
BASOPHILS # BLD AUTO: 0.05 K/UL — SIGNIFICANT CHANGE UP (ref 0–0.2)
BASOPHILS NFR BLD AUTO: 1.1 % — SIGNIFICANT CHANGE UP (ref 0–2)
BILIRUB SERPL-MCNC: 0.6 MG/DL — SIGNIFICANT CHANGE UP (ref 0.2–1.2)
BUN SERPL-MCNC: 17 MG/DL — SIGNIFICANT CHANGE UP (ref 7–23)
CALCIUM SERPL-MCNC: 9.2 MG/DL — SIGNIFICANT CHANGE UP (ref 8.5–10.1)
CHLORIDE SERPL-SCNC: 106 MMOL/L — SIGNIFICANT CHANGE UP (ref 96–108)
CO2 SERPL-SCNC: 31 MMOL/L — SIGNIFICANT CHANGE UP (ref 22–31)
CREAT SERPL-MCNC: 1.2 MG/DL — SIGNIFICANT CHANGE UP (ref 0.5–1.3)
EGFR: 62 ML/MIN/1.73M2 — SIGNIFICANT CHANGE UP
EOSINOPHIL # BLD AUTO: 0.22 K/UL — SIGNIFICANT CHANGE UP (ref 0–0.5)
EOSINOPHIL NFR BLD AUTO: 4.9 % — SIGNIFICANT CHANGE UP (ref 0–6)
GLUCOSE SERPL-MCNC: 111 MG/DL — HIGH (ref 70–99)
HCT VFR BLD CALC: 43.3 % — SIGNIFICANT CHANGE UP (ref 39–50)
HGB BLD-MCNC: 14.1 G/DL — SIGNIFICANT CHANGE UP (ref 13–17)
IMM GRANULOCYTES NFR BLD AUTO: 0.2 % — SIGNIFICANT CHANGE UP (ref 0–0.9)
INR BLD: 1.76 RATIO — HIGH (ref 0.85–1.18)
LYMPHOCYTES # BLD AUTO: 1.2 K/UL — SIGNIFICANT CHANGE UP (ref 1–3.3)
LYMPHOCYTES # BLD AUTO: 26.8 % — SIGNIFICANT CHANGE UP (ref 13–44)
MCHC RBC-ENTMCNC: 31.8 PG — SIGNIFICANT CHANGE UP (ref 27–34)
MCHC RBC-ENTMCNC: 32.6 GM/DL — SIGNIFICANT CHANGE UP (ref 32–36)
MCV RBC AUTO: 97.5 FL — SIGNIFICANT CHANGE UP (ref 80–100)
MONOCYTES # BLD AUTO: 0.53 K/UL — SIGNIFICANT CHANGE UP (ref 0–0.9)
MONOCYTES NFR BLD AUTO: 11.8 % — SIGNIFICANT CHANGE UP (ref 2–14)
NEUTROPHILS # BLD AUTO: 2.47 K/UL — SIGNIFICANT CHANGE UP (ref 1.8–7.4)
NEUTROPHILS NFR BLD AUTO: 55.2 % — SIGNIFICANT CHANGE UP (ref 43–77)
NRBC # BLD: 0 /100 WBCS — SIGNIFICANT CHANGE UP (ref 0–0)
PLATELET # BLD AUTO: 175 K/UL — SIGNIFICANT CHANGE UP (ref 150–400)
POTASSIUM SERPL-MCNC: 4.7 MMOL/L — SIGNIFICANT CHANGE UP (ref 3.5–5.3)
POTASSIUM SERPL-SCNC: 4.7 MMOL/L — SIGNIFICANT CHANGE UP (ref 3.5–5.3)
PROT SERPL-MCNC: 6.7 G/DL — SIGNIFICANT CHANGE UP (ref 6–8.3)
PROTHROM AB SERPL-ACNC: 20.3 SEC — HIGH (ref 9.5–13)
RBC # BLD: 4.44 M/UL — SIGNIFICANT CHANGE UP (ref 4.2–5.8)
RBC # FLD: 12.7 % — SIGNIFICANT CHANGE UP (ref 10.3–14.5)
SODIUM SERPL-SCNC: 139 MMOL/L — SIGNIFICANT CHANGE UP (ref 135–145)
TROPONIN I, HIGH SENSITIVITY RESULT: 9.5 NG/L — SIGNIFICANT CHANGE UP
WBC # BLD: 4.48 K/UL — SIGNIFICANT CHANGE UP (ref 3.8–10.5)
WBC # FLD AUTO: 4.48 K/UL — SIGNIFICANT CHANGE UP (ref 3.8–10.5)

## 2024-05-15 PROCEDURE — 99284 EMERGENCY DEPT VISIT MOD MDM: CPT

## 2024-05-15 PROCEDURE — 93005 ELECTROCARDIOGRAM TRACING: CPT

## 2024-05-15 PROCEDURE — 70450 CT HEAD/BRAIN W/O DYE: CPT | Mod: MC

## 2024-05-15 PROCEDURE — 36415 COLL VENOUS BLD VENIPUNCTURE: CPT

## 2024-05-15 PROCEDURE — 96360 HYDRATION IV INFUSION INIT: CPT

## 2024-05-15 PROCEDURE — 82962 GLUCOSE BLOOD TEST: CPT

## 2024-05-15 PROCEDURE — 85730 THROMBOPLASTIN TIME PARTIAL: CPT

## 2024-05-15 PROCEDURE — 99285 EMERGENCY DEPT VISIT HI MDM: CPT | Mod: 25

## 2024-05-15 PROCEDURE — 84484 ASSAY OF TROPONIN QUANT: CPT

## 2024-05-15 PROCEDURE — 93010 ELECTROCARDIOGRAM REPORT: CPT

## 2024-05-15 PROCEDURE — 80053 COMPREHEN METABOLIC PANEL: CPT

## 2024-05-15 PROCEDURE — 85025 COMPLETE CBC W/AUTO DIFF WBC: CPT

## 2024-05-15 PROCEDURE — 70450 CT HEAD/BRAIN W/O DYE: CPT | Mod: 26,MC

## 2024-05-15 PROCEDURE — 96361 HYDRATE IV INFUSION ADD-ON: CPT

## 2024-05-15 PROCEDURE — 85610 PROTHROMBIN TIME: CPT

## 2024-05-15 RX ORDER — WARFARIN SODIUM 2.5 MG/1
1 TABLET ORAL
Refills: 0 | DISCHARGE

## 2024-05-15 RX ORDER — WARFARIN SODIUM 2.5 MG/1
2 TABLET ORAL
Refills: 0 | DISCHARGE

## 2024-05-15 RX ORDER — WARFARIN SODIUM 2.5 MG/1
1 TABLET ORAL
Qty: 0 | Refills: 0 | DISCHARGE

## 2024-05-15 RX ORDER — SODIUM CHLORIDE 9 MG/ML
1000 INJECTION, SOLUTION INTRAVENOUS ONCE
Refills: 0 | Status: COMPLETED | OUTPATIENT
Start: 2024-05-15 | End: 2024-05-15

## 2024-05-15 RX ORDER — SODIUM CHLORIDE 9 MG/ML
1000 INJECTION INTRAMUSCULAR; INTRAVENOUS; SUBCUTANEOUS ONCE
Refills: 0 | Status: COMPLETED | OUTPATIENT
Start: 2024-05-15 | End: 2024-05-15

## 2024-05-15 RX ORDER — SIMVASTATIN 20 MG/1
1 TABLET, FILM COATED ORAL
Refills: 0 | DISCHARGE

## 2024-05-15 RX ADMIN — SODIUM CHLORIDE 1000 MILLILITER(S): 9 INJECTION, SOLUTION INTRAVENOUS at 16:00

## 2024-05-15 RX ADMIN — SODIUM CHLORIDE 1000 MILLILITER(S): 9 INJECTION INTRAMUSCULAR; INTRAVENOUS; SUBCUTANEOUS at 11:06

## 2024-05-15 RX ADMIN — SODIUM CHLORIDE 1000 MILLILITER(S): 9 INJECTION INTRAMUSCULAR; INTRAVENOUS; SUBCUTANEOUS at 12:30

## 2024-05-15 RX ADMIN — SODIUM CHLORIDE 1000 MILLILITER(S): 9 INJECTION INTRAMUSCULAR; INTRAVENOUS; SUBCUTANEOUS at 14:30

## 2024-05-15 RX ADMIN — SODIUM CHLORIDE 1000 MILLILITER(S): 9 INJECTION, SOLUTION INTRAVENOUS at 14:48

## 2024-05-15 RX ADMIN — SODIUM CHLORIDE 1000 MILLILITER(S): 9 INJECTION INTRAMUSCULAR; INTRAVENOUS; SUBCUTANEOUS at 13:32

## 2024-05-15 NOTE — ED ADULT NURSE NOTE - OBJECTIVE STATEMENT
patient presents to ED with visual disturbances.  patient states that he had a stroke 4 years ago with the same symptoms.  patient had rotator cuff surgery a few days ago and has not been able to sleep due to the pain.  patient states that his vision has returned.

## 2024-05-15 NOTE — ED PROVIDER NOTE - OBJECTIVE STATEMENT
Patient states he had left shoulder surgery on Friday for rotator cuff tear.  Patient has been taking Percocets but they have been making him nauseous and dizzy.  He stopped taking it yesterday and has been on Tylenol since.  Patient this morning woke up and had sudden pain in his throat which resolved and felt a little lightheaded.  He went to go  the paper from an outside and he felt like he was about to pass out but did not.  When he started reading the paper when he realized that he could not focus on the words well so he called his friend to come to the ER thinking this might be a stroke.  Patient states now all his symptoms are resolved.  Patient feels thirsty but has no current visual disturbances.

## 2024-05-15 NOTE — ED PROVIDER NOTE - NSFOLLOWUPINSTRUCTIONS_ED_ALL_ED_FT
Please ensure that you remain adequately hydrated  Follow up with your Primary Care Doctor.  Seek Medical attention or return to the emergency department for any new or worsening symptoms.

## 2024-05-15 NOTE — ED PROVIDER NOTE - PHYSICAL EXAMINATION
Gen: alert, NAD  HEENT:  NC/AT, PERRLA  CV:  well perfused  Pulm:  normal RR, breathing comfortably  Abd: s/nt/nd  MSK: moving all extremities  Neuro:  non-focal, NIHSS=0  Skin:  visualized areas intact  Psych: AOx3

## 2024-05-15 NOTE — PHARMACOTHERAPY INTERVENTION NOTE - COMMENTS
78 yo M presenting as a code stroke. Medication history obtained with [facility], patient, and wife at bedside. Of note, patient takes warfarin 10 mg (2 5 mg tablets) Sunday/Wednesday and  warfarin 7.5 mg Monday/Tuesday/Thursday/Friday/Saturday. Patient last took warfarin 10 mg this morning (5/15/2024). Medication reconciliation completed, findings relayed to Dr. Carrasco.

## 2024-05-15 NOTE — ED ADULT NURSE NOTE - NSFALLUNIVINTERV_ED_ALL_ED
Bed/Stretcher in lowest position, wheels locked, appropriate side rails in place/Call bell, personal items and telephone in reach/Instruct patient to call for assistance before getting out of bed/chair/stretcher/Non-slip footwear applied when patient is off stretcher/[place] to call system/Physically safe environment - no spills, clutter or unnecessary equipment/Purposeful proactive rounding/Room/bathroom lighting operational, light cord in reach

## 2024-05-15 NOTE — ED PROVIDER NOTE - CLINICAL SUMMARY MEDICAL DECISION MAKING FREE TEXT BOX
Patient with likely near syncopal episode.  Patient hypotensive on arrival likely secondary to dehydration.

## 2024-05-15 NOTE — ED ADULT TRIAGE NOTE - CHIEF COMPLAINT QUOTE
pt states that he had BL vision loss around 8:30 am today. pt states that he had a stroke 4 years ago

## 2024-05-15 NOTE — ED PROVIDER NOTE - NSICDXPASTSURGICALHX_GEN_ALL_CORE_FT
PAST SURGICAL HISTORY:  Elective surgery diaphragm repair   2016  -    H/O hiatal hernia     H/O shoulder surgery     Volvulus

## 2024-07-29 ENCOUNTER — OFFICE (OUTPATIENT)
Dept: URBAN - METROPOLITAN AREA CLINIC 104 | Facility: CLINIC | Age: 79
Setting detail: OPHTHALMOLOGY
End: 2024-07-29
Payer: MEDICARE

## 2024-07-29 DIAGNOSIS — H35.3131: ICD-10-CM

## 2024-07-29 PROBLEM — H26.492 POSTERIOR CAPSULAR OPACIFICATION; LEFT EYE: Status: ACTIVE | Noted: 2024-07-29

## 2024-07-29 PROCEDURE — 92004 COMPRE OPH EXAM NEW PT 1/>: CPT | Performed by: OPTOMETRIST

## 2024-07-29 PROCEDURE — 92134 CPTRZ OPH DX IMG PST SGM RTA: CPT | Performed by: OPTOMETRIST

## 2024-07-29 ASSESSMENT — CONFRONTATIONAL VISUAL FIELD TEST (CVF)
OS_FINDINGS: FULL
OD_FINDINGS: FULL

## 2025-03-05 ENCOUNTER — OFFICE (OUTPATIENT)
Dept: URBAN - METROPOLITAN AREA CLINIC 104 | Facility: CLINIC | Age: 80
Setting detail: OPHTHALMOLOGY
End: 2025-03-05
Payer: MEDICARE

## 2025-03-05 DIAGNOSIS — H35.3131: ICD-10-CM

## 2025-03-05 DIAGNOSIS — Z96.1: ICD-10-CM

## 2025-03-05 DIAGNOSIS — H43.813: ICD-10-CM

## 2025-03-05 DIAGNOSIS — H26.492: ICD-10-CM

## 2025-03-05 PROCEDURE — 92014 COMPRE OPH EXAM EST PT 1/>: CPT | Performed by: OPTOMETRIST

## 2025-03-05 PROCEDURE — 92134 CPTRZ OPH DX IMG PST SGM RTA: CPT | Performed by: OPTOMETRIST

## 2025-03-05 ASSESSMENT — VISUAL ACUITY
OD_BCVA: 20/30
OS_BCVA: 20/25

## 2025-03-05 ASSESSMENT — CONFRONTATIONAL VISUAL FIELD TEST (CVF)
OS_FINDINGS: FULL
OD_FINDINGS: FULL

## 2025-03-05 ASSESSMENT — TONOMETRY
OS_IOP_MMHG: 10
OD_IOP_MMHG: 12